# Patient Record
Sex: FEMALE | Race: WHITE | HISPANIC OR LATINO | URBAN - METROPOLITAN AREA
[De-identification: names, ages, dates, MRNs, and addresses within clinical notes are randomized per-mention and may not be internally consistent; named-entity substitution may affect disease eponyms.]

---

## 2017-07-02 ENCOUNTER — APPOINTMENT (OUTPATIENT)
Dept: RADIOLOGY | Facility: MEDICAL CENTER | Age: 47
DRG: 863 | End: 2017-07-02
Attending: EMERGENCY MEDICINE
Payer: COMMERCIAL

## 2017-07-02 ENCOUNTER — APPOINTMENT (OUTPATIENT)
Dept: RADIOLOGY | Facility: MEDICAL CENTER | Age: 47
DRG: 863 | End: 2017-07-02
Payer: COMMERCIAL

## 2017-07-02 ENCOUNTER — HOSPITAL ENCOUNTER (INPATIENT)
Facility: MEDICAL CENTER | Age: 47
LOS: 3 days | DRG: 863 | End: 2017-07-05
Attending: EMERGENCY MEDICINE | Admitting: HOSPITALIST
Payer: COMMERCIAL

## 2017-07-02 ENCOUNTER — RESOLUTE PROFESSIONAL BILLING HOSPITAL PROF FEE (OUTPATIENT)
Dept: HOSPITALIST | Facility: MEDICAL CENTER | Age: 47
End: 2017-07-02
Payer: COMMERCIAL

## 2017-07-02 DIAGNOSIS — L03.313 CELLULITIS OF CHEST WALL: ICD-10-CM

## 2017-07-02 PROBLEM — N61.1 BREAST ABSCESS: Status: ACTIVE | Noted: 2017-07-02

## 2017-07-02 PROBLEM — L03.90 CELLULITIS: Status: ACTIVE | Noted: 2017-07-02

## 2017-07-02 LAB
ALBUMIN SERPL BCP-MCNC: 3.9 G/DL (ref 3.2–4.9)
ALBUMIN/GLOB SERPL: 1.1 G/DL
ALP SERPL-CCNC: 60 U/L (ref 30–99)
ALT SERPL-CCNC: 20 U/L (ref 2–50)
ANION GAP SERPL CALC-SCNC: 8 MMOL/L (ref 0–11.9)
APPEARANCE UR: CLEAR
APTT PPP: 31.9 SEC (ref 24.7–36)
AST SERPL-CCNC: 16 U/L (ref 12–45)
BASOPHILS # BLD AUTO: 0.5 % (ref 0–1.8)
BASOPHILS # BLD: 0.06 K/UL (ref 0–0.12)
BILIRUB SERPL-MCNC: 0.4 MG/DL (ref 0.1–1.5)
BILIRUB UR QL STRIP.AUTO: NEGATIVE
BUN SERPL-MCNC: 10 MG/DL (ref 8–22)
CALCIUM SERPL-MCNC: 9.6 MG/DL (ref 8.5–10.5)
CHLORIDE SERPL-SCNC: 102 MMOL/L (ref 96–112)
CO2 SERPL-SCNC: 24 MMOL/L (ref 20–33)
COLOR UR: YELLOW
CREAT SERPL-MCNC: 0.7 MG/DL (ref 0.5–1.4)
CRP SERPL HS-MCNC: 157.6 MG/L (ref 0–7.5)
EOSINOPHIL # BLD AUTO: 0.29 K/UL (ref 0–0.51)
EOSINOPHIL NFR BLD: 2.6 % (ref 0–6.9)
ERYTHROCYTE [DISTWIDTH] IN BLOOD BY AUTOMATED COUNT: 42.6 FL (ref 35.9–50)
ERYTHROCYTE [SEDIMENTATION RATE] IN BLOOD BY WESTERGREN METHOD: 64 MM/HOUR (ref 0–20)
GFR SERPL CREATININE-BSD FRML MDRD: >60 ML/MIN/1.73 M 2
GLOBULIN SER CALC-MCNC: 3.4 G/DL (ref 1.9–3.5)
GLUCOSE SERPL-MCNC: 100 MG/DL (ref 65–99)
GLUCOSE UR STRIP.AUTO-MCNC: NEGATIVE MG/DL
HCT VFR BLD AUTO: 37.9 % (ref 37–47)
HGB BLD-MCNC: 12.8 G/DL (ref 12–16)
IMM GRANULOCYTES # BLD AUTO: 0.04 K/UL (ref 0–0.11)
IMM GRANULOCYTES NFR BLD AUTO: 0.4 % (ref 0–0.9)
INR PPP: 1 (ref 0.87–1.13)
KETONES UR STRIP.AUTO-MCNC: NEGATIVE MG/DL
LACTATE BLD-SCNC: 1.2 MMOL/L (ref 0.5–2)
LACTATE BLD-SCNC: 1.4 MMOL/L (ref 0.5–2)
LEUKOCYTE ESTERASE UR QL STRIP.AUTO: NEGATIVE
LYMPHOCYTES # BLD AUTO: 2.41 K/UL (ref 1–4.8)
LYMPHOCYTES NFR BLD: 21.9 % (ref 22–41)
MCH RBC QN AUTO: 29.9 PG (ref 27–33)
MCHC RBC AUTO-ENTMCNC: 33.8 G/DL (ref 33.6–35)
MCV RBC AUTO: 88.6 FL (ref 81.4–97.8)
MICRO URNS: NORMAL
MONOCYTES # BLD AUTO: 0.71 K/UL (ref 0–0.85)
MONOCYTES NFR BLD AUTO: 6.4 % (ref 0–13.4)
NEUTROPHILS # BLD AUTO: 7.5 K/UL (ref 2–7.15)
NEUTROPHILS NFR BLD: 68.2 % (ref 44–72)
NITRITE UR QL STRIP.AUTO: NEGATIVE
NRBC # BLD AUTO: 0 K/UL
NRBC BLD AUTO-RTO: 0 /100 WBC
PH UR STRIP.AUTO: 6 [PH]
PLATELET # BLD AUTO: 347 K/UL (ref 164–446)
PMV BLD AUTO: 8.6 FL (ref 9–12.9)
POTASSIUM SERPL-SCNC: 3.9 MMOL/L (ref 3.6–5.5)
PROT SERPL-MCNC: 7.3 G/DL (ref 6–8.2)
PROT UR QL STRIP: NEGATIVE MG/DL
PROTHROMBIN TIME: 13.5 SEC (ref 12–14.6)
RBC # BLD AUTO: 4.28 M/UL (ref 4.2–5.4)
RBC UR QL AUTO: NEGATIVE
SODIUM SERPL-SCNC: 134 MMOL/L (ref 135–145)
SP GR UR STRIP.AUTO: 1.01
WBC # BLD AUTO: 11 K/UL (ref 4.8–10.8)

## 2017-07-02 PROCEDURE — 76604 US EXAM CHEST: CPT

## 2017-07-02 PROCEDURE — 85730 THROMBOPLASTIN TIME PARTIAL: CPT

## 2017-07-02 PROCEDURE — 700111 HCHG RX REV CODE 636 W/ 250 OVERRIDE (IP): Performed by: HOSPITALIST

## 2017-07-02 PROCEDURE — A9270 NON-COVERED ITEM OR SERVICE: HCPCS | Performed by: HOSPITALIST

## 2017-07-02 PROCEDURE — 87040 BLOOD CULTURE FOR BACTERIA: CPT

## 2017-07-02 PROCEDURE — 86141 C-REACTIVE PROTEIN HS: CPT

## 2017-07-02 PROCEDURE — 87086 URINE CULTURE/COLONY COUNT: CPT

## 2017-07-02 PROCEDURE — 85610 PROTHROMBIN TIME: CPT

## 2017-07-02 PROCEDURE — 770001 HCHG ROOM/CARE - MED/SURG/GYN PRIV*

## 2017-07-02 PROCEDURE — 700105 HCHG RX REV CODE 258: Performed by: EMERGENCY MEDICINE

## 2017-07-02 PROCEDURE — 96365 THER/PROPH/DIAG IV INF INIT: CPT

## 2017-07-02 PROCEDURE — 87205 SMEAR GRAM STAIN: CPT

## 2017-07-02 PROCEDURE — 96368 THER/DIAG CONCURRENT INF: CPT

## 2017-07-02 PROCEDURE — 700102 HCHG RX REV CODE 250 W/ 637 OVERRIDE(OP): Performed by: HOSPITALIST

## 2017-07-02 PROCEDURE — 71010 DX-CHEST-PORTABLE (1 VIEW): CPT

## 2017-07-02 PROCEDURE — 700111 HCHG RX REV CODE 636 W/ 250 OVERRIDE (IP): Performed by: EMERGENCY MEDICINE

## 2017-07-02 PROCEDURE — 85025 COMPLETE CBC W/AUTO DIFF WBC: CPT

## 2017-07-02 PROCEDURE — 700101 HCHG RX REV CODE 250: Performed by: SURGERY

## 2017-07-02 PROCEDURE — 81003 URINALYSIS AUTO W/O SCOPE: CPT

## 2017-07-02 PROCEDURE — 87070 CULTURE OTHR SPECIMN AEROBIC: CPT

## 2017-07-02 PROCEDURE — 99223 1ST HOSP IP/OBS HIGH 75: CPT | Performed by: HOSPITALIST

## 2017-07-02 PROCEDURE — 96366 THER/PROPH/DIAG IV INF ADDON: CPT

## 2017-07-02 PROCEDURE — 99285 EMERGENCY DEPT VISIT HI MDM: CPT

## 2017-07-02 PROCEDURE — 700105 HCHG RX REV CODE 258: Performed by: HOSPITALIST

## 2017-07-02 PROCEDURE — 83605 ASSAY OF LACTIC ACID: CPT

## 2017-07-02 PROCEDURE — 85652 RBC SED RATE AUTOMATED: CPT

## 2017-07-02 PROCEDURE — 303977 HCHG I & D

## 2017-07-02 PROCEDURE — 80053 COMPREHEN METABOLIC PANEL: CPT

## 2017-07-02 PROCEDURE — 87075 CULTR BACTERIA EXCEPT BLOOD: CPT

## 2017-07-02 RX ORDER — BISACODYL 10 MG
10 SUPPOSITORY, RECTAL RECTAL
Status: DISCONTINUED | OUTPATIENT
Start: 2017-07-02 | End: 2017-07-05 | Stop reason: HOSPADM

## 2017-07-02 RX ORDER — CEPHALEXIN 500 MG/1
500 CAPSULE ORAL 3 TIMES DAILY
Status: ON HOLD | COMMUNITY
Start: 2017-06-16 | End: 2017-07-05

## 2017-07-02 RX ORDER — OXYCODONE HYDROCHLORIDE AND ACETAMINOPHEN 5; 325 MG/1; MG/1
1-2 TABLET ORAL EVERY 6 HOURS PRN
Status: DISCONTINUED | OUTPATIENT
Start: 2017-07-02 | End: 2017-07-05 | Stop reason: HOSPADM

## 2017-07-02 RX ORDER — ONDANSETRON 2 MG/ML
4 INJECTION INTRAMUSCULAR; INTRAVENOUS EVERY 4 HOURS PRN
Status: DISCONTINUED | OUTPATIENT
Start: 2017-07-02 | End: 2017-07-05 | Stop reason: HOSPADM

## 2017-07-02 RX ORDER — ONDANSETRON 4 MG/1
4 TABLET, ORALLY DISINTEGRATING ORAL EVERY 4 HOURS PRN
Status: DISCONTINUED | OUTPATIENT
Start: 2017-07-02 | End: 2017-07-05 | Stop reason: HOSPADM

## 2017-07-02 RX ORDER — PROMETHAZINE HYDROCHLORIDE 25 MG/1
12.5-25 SUPPOSITORY RECTAL EVERY 4 HOURS PRN
Status: DISCONTINUED | OUTPATIENT
Start: 2017-07-02 | End: 2017-07-05 | Stop reason: HOSPADM

## 2017-07-02 RX ORDER — DOCUSATE SODIUM 100 MG/1
200 CAPSULE, LIQUID FILLED ORAL DAILY
Status: DISCONTINUED | OUTPATIENT
Start: 2017-07-02 | End: 2017-07-02

## 2017-07-02 RX ORDER — DOCUSATE SODIUM 100 MG/1
200 CAPSULE, LIQUID FILLED ORAL DAILY
COMMUNITY

## 2017-07-02 RX ORDER — SODIUM CHLORIDE 9 MG/ML
INJECTION, SOLUTION INTRAVENOUS CONTINUOUS
Status: DISCONTINUED | OUTPATIENT
Start: 2017-07-02 | End: 2017-07-03

## 2017-07-02 RX ORDER — LIDOCAINE HYDROCHLORIDE AND EPINEPHRINE BITARTRATE 20; .01 MG/ML; MG/ML
10 INJECTION, SOLUTION SUBCUTANEOUS ONCE
Status: COMPLETED | OUTPATIENT
Start: 2017-07-02 | End: 2017-07-02

## 2017-07-02 RX ORDER — ALPRAZOLAM 0.25 MG/1
0.25 TABLET ORAL 4 TIMES DAILY PRN
Status: DISCONTINUED | OUTPATIENT
Start: 2017-07-02 | End: 2017-07-05 | Stop reason: HOSPADM

## 2017-07-02 RX ORDER — PROMETHAZINE HYDROCHLORIDE 25 MG/1
12.5-25 TABLET ORAL EVERY 4 HOURS PRN
Status: DISCONTINUED | OUTPATIENT
Start: 2017-07-02 | End: 2017-07-05 | Stop reason: HOSPADM

## 2017-07-02 RX ORDER — ASPIRIN 325 MG
2 TABLET ORAL DAILY
COMMUNITY

## 2017-07-02 RX ORDER — POLYETHYLENE GLYCOL 3350 17 G/17G
1 POWDER, FOR SOLUTION ORAL
Status: DISCONTINUED | OUTPATIENT
Start: 2017-07-02 | End: 2017-07-05 | Stop reason: HOSPADM

## 2017-07-02 RX ORDER — LIDOCAINE HYDROCHLORIDE AND EPINEPHRINE 10; 10 MG/ML; UG/ML
20 INJECTION, SOLUTION INFILTRATION; PERINEURAL ONCE
Status: DISCONTINUED | OUTPATIENT
Start: 2017-07-02 | End: 2017-07-02

## 2017-07-02 RX ORDER — DIPHENHYDRAMINE HYDROCHLORIDE 50 MG/ML
25 INJECTION INTRAMUSCULAR; INTRAVENOUS EVERY 8 HOURS
Status: DISCONTINUED | OUTPATIENT
Start: 2017-07-03 | End: 2017-07-04

## 2017-07-02 RX ORDER — AMOXICILLIN 250 MG
2 CAPSULE ORAL 2 TIMES DAILY
Status: DISCONTINUED | OUTPATIENT
Start: 2017-07-03 | End: 2017-07-05 | Stop reason: HOSPADM

## 2017-07-02 RX ORDER — OXYCODONE HYDROCHLORIDE AND ACETAMINOPHEN 5; 325 MG/1; MG/1
1-2 TABLET ORAL EVERY 6 HOURS PRN
COMMUNITY

## 2017-07-02 RX ORDER — AMPICILLIN AND SULBACTAM 2; 1 G/1; G/1
3 INJECTION, POWDER, FOR SOLUTION INTRAMUSCULAR; INTRAVENOUS ONCE
Status: COMPLETED | OUTPATIENT
Start: 2017-07-02 | End: 2017-07-02

## 2017-07-02 RX ADMIN — ALPRAZOLAM 0.25 MG: 0.25 TABLET ORAL at 22:24

## 2017-07-02 RX ADMIN — LIDOCAINE HYDROCHLORIDE AND EPINEPHRINE 10 ML: 20; 10 INJECTION, SOLUTION INFILTRATION; PERINEURAL at 20:15

## 2017-07-02 RX ADMIN — AMPICILLIN SODIUM AND SULBACTAM SODIUM 3 G: 2; 1 INJECTION, POWDER, FOR SOLUTION INTRAMUSCULAR; INTRAVENOUS at 22:23

## 2017-07-02 RX ADMIN — SODIUM CHLORIDE: 9 INJECTION, SOLUTION INTRAVENOUS at 22:23

## 2017-07-02 RX ADMIN — AMPICILLIN SODIUM AND SULBACTAM SODIUM 3 G: 2; 1 INJECTION, POWDER, FOR SOLUTION INTRAMUSCULAR; INTRAVENOUS at 17:12

## 2017-07-02 RX ADMIN — VANCOMYCIN HYDROCHLORIDE 1900 MG: 100 INJECTION, POWDER, LYOPHILIZED, FOR SOLUTION INTRAVENOUS at 17:29

## 2017-07-02 ASSESSMENT — PAIN SCALES - GENERAL
PAINLEVEL_OUTOF10: 0
PAINLEVEL_OUTOF10: 0

## 2017-07-02 ASSESSMENT — LIFESTYLE VARIABLES
EVER_SMOKED: NEVER
DO YOU DRINK ALCOHOL: NO

## 2017-07-02 NOTE — ED PROVIDER NOTES
ED Provider Note    CHIEF COMPLAINT  Chief Complaint   Patient presents with   • Sent by MD     Sent by Dr. Birmingham, double mastectomy on June 14, drains pulled on Thursday, breast red, swollen, hot to touch, patient febrile       HPI  Mine Yap is a 46 y.o. female who presents with erythematous change to the right chest wall.  Patient had placement of expanders following mastectomy.  Drains had been pulled out last Thursday by Dr. Birmingham who called from her office stating the patient appeared to have infection of the right chest wall/breast.  She requested the patient be admitted for IV antibiotics and that ultrasound be obtained to evaluate for possible abscess.  Patient states over the past 2-3 days redness swelling and warmth to the skin as well as chills at home have developed.  She arrives tachycardic, septic protocol initiated.  Pain is mild-to-moderate, patient states she took her own Percocet this morning for pain, currently refusing any pain medication.  She denies acute numbness or weakness to the extremities.  No cough or shortness of breath.  No sore throat.  No vomiting or diarrhea    REVIEW OF SYSTEMS    Constitutional: Chills  Respiratory: No shortness of breath or cough  Cardiac: No exertional chest pain or syncope  Gastrointestinal: No abdominal pain  Musculoskeletal: Right chest wall pain  Neurologic: No headache  Skin: Redness swelling and induration without drainage to the right surgical site on breast       All other systems are negative.       PAST MEDICAL HISTORY  Past Medical History   Diagnosis Date   • Cancer (CMS-[facility])      bilateral breast       FAMILY HISTORY  History reviewed. No pertinent family history.    SOCIAL HISTORY  Social History     Social History   • Marital Status:      Spouse Name: N/A   • Number of Children: N/A   • Years of Education: N/A     Social History Main Topics   • Smoking status: Never Smoker    • Smokeless tobacco: None   • Alcohol Use: No   •  Drug Use: No   • Sexual Activity: Not Asked     Other Topics Concern   • None     Social History Narrative   • None       SURGICAL HISTORY  Past Surgical History   Procedure Laterality Date   • Other       bilateral mastectomy       CURRENT MEDICATIONS  Home Medications     Reviewed by Laure Ashford (Pharmacy Tech) on 07/02/17 at 1635  Med List Status: Complete    Medication Last Dose Status    cephALEXin (KEFLEX) 500 MG Cap 6/29/2017 Active    docusate sodium (COLACE) 100 MG Cap 7/2/2017 Active    Multiple Vitamins-Minerals (MULTIVITAMIN GUMMIES ADULT) Chew Tab 7/1/2017 Active    oxycodone-acetaminophen (PERCOCET) 5-325 MG Tab 7/2/2017 Active                ALLERGIES  No Known Allergies    PHYSICAL EXAM  VITAL SIGNS: /70 mmHg  Pulse 85  Temp(Src) 37.3 °C (99.1 °F) (Temporal)  Resp 18  Ht 1.524 m (5')  Wt 75.9 kg (167 lb 5.3 oz)  BMI 32.68 kg/m2  SpO2 96%  LMP 06/06/2017  Constitutional:  Non-toxic appearance.   HENT: No facial swelling  Eyes: Anicteric, no conjunctivitis.     Cardiovascular: Tachycardic heart rate, Normal rhythm  Pulmonary:  No wheezing, No rales.   Gastrointestinal: Soft, No tenderness, No masses  Skin: Warm, Dry, No cyanosis.  Erythematous change with induration and warmth large area right breast, no fluctuance  Neurologic: Speech is clear, follows commands, facial expression is symmetrical.  Psychiatric: Affect normal,  Mood normal.  Patient is calm and cooperative  Musculoskeletal: Right anterior chest wall tenderness corresponding to the area of cellulitis    EKG/Labs  Results for orders placed or performed during the hospital encounter of 07/02/17   Lactic acid (lactate)   Result Value Ref Range    Lactic Acid 1.4 0.5 - 2.0 mmol/L   Lactic acid (lactate)   Result Value Ref Range    Lactic Acid 1.2 0.5 - 2.0 mmol/L   CBC WITH DIFFERENTIAL   Result Value Ref Range    WBC 11.0 (H) 4.8 - 10.8 K/uL    RBC 4.28 4.20 - 5.40 M/uL    Hemoglobin 12.8 12.0 - 16.0 g/dL     Hematocrit 37.9 37.0 - 47.0 %    MCV 88.6 81.4 - 97.8 fL    MCH 29.9 27.0 - 33.0 pg    MCHC 33.8 33.6 - 35.0 g/dL    RDW 42.6 35.9 - 50.0 fL    Platelet Count 347 164 - 446 K/uL    MPV 8.6 (L) 9.0 - 12.9 fL    Neutrophils-Polys 68.20 44.00 - 72.00 %    Lymphocytes 21.90 (L) 22.00 - 41.00 %    Monocytes 6.40 0.00 - 13.40 %    Eosinophils 2.60 0.00 - 6.90 %    Basophils 0.50 0.00 - 1.80 %    Immature Granulocytes 0.40 0.00 - 0.90 %    Nucleated RBC 0.00 /100 WBC    Neutrophils (Absolute) 7.50 (H) 2.00 - 7.15 K/uL    Lymphs (Absolute) 2.41 1.00 - 4.80 K/uL    Monos (Absolute) 0.71 0.00 - 0.85 K/uL    Eos (Absolute) 0.29 0.00 - 0.51 K/uL    Baso (Absolute) 0.06 0.00 - 0.12 K/uL    Immature Granulocytes (abs) 0.04 0.00 - 0.11 K/uL    NRBC (Absolute) 0.00 K/uL   COMP METABOLIC PANEL   Result Value Ref Range    Sodium 134 (L) 135 - 145 mmol/L    Potassium 3.9 3.6 - 5.5 mmol/L    Chloride 102 96 - 112 mmol/L    Co2 24 20 - 33 mmol/L    Anion Gap 8.0 0.0 - 11.9    Glucose 100 (H) 65 - 99 mg/dL    Bun 10 8 - 22 mg/dL    Creatinine 0.70 0.50 - 1.40 mg/dL    Calcium 9.6 8.5 - 10.5 mg/dL    AST(SGOT) 16 12 - 45 U/L    ALT(SGPT) 20 2 - 50 U/L    Alkaline Phosphatase 60 30 - 99 U/L    Total Bilirubin 0.4 0.1 - 1.5 mg/dL    Albumin 3.9 3.2 - 4.9 g/dL    Total Protein 7.3 6.0 - 8.2 g/dL    Globulin 3.4 1.9 - 3.5 g/dL    A-G Ratio 1.1 g/dL   URINALYSIS   Result Value Ref Range    Color Yellow     Character Clear     Specific Gravity 1.015 <1.035    Ph 6.0 5.0-8.0    Glucose Negative Negative mg/dL    Ketones Negative Negative mg/dL    Protein Negative Negative mg/dL    Bilirubin Negative Negative    Nitrite Negative Negative    Leukocyte Esterase Negative Negative    Occult Blood Negative Negative    Micro Urine Req see below    PROTHROMBIN TIME   Result Value Ref Range    PT 13.5 12.0 - 14.6 sec    INR 1.00 0.87 - 1.13   APTT   Result Value Ref Range    APTT 31.9 24.7 - 36.0 sec   ESTIMATED GFR   Result Value Ref Range    GFR If  African American >60 >60 mL/min/1.73 m 2    GFR If Non African American >60 >60 mL/min/1.73 m 2   WESTERGREN SED RATE   Result Value Ref Range    Sed Rate Westergren 64 (H) 0 - 20 mm/hour   CRP HIGH SENSITIVE (CARDIAC)   Result Value Ref Range    C Reactive Protein High Sensitive 157.6 (H) 0.0 - 7.5 mg/L         RADIOLOGY/PROCEDURES  DX-CHEST-PORTABLE (1 VIEW)   Final Result      No evidence of acute cardiopulmonary process.      US-CHEST   Final Result      Tiny amount of fluid seen within the subcutaneous fat of the right chest wall. There is no evidence of a large organized fluid collection to suggest presence of abscess.            COURSE & MEDICAL DECISION MAKING  Pertinent Labs & Imaging studies reviewed. (See chart for details)  Small collection of fluid was noted, Dr. Nugent stated she would tap this fluid.  Patient was started on IV vancomycin and Unasyn for coverage of skin infection in setting of possible sepsis.  Initial lactic acid came back negative, patient does not appear to be septic at this time.  She is admitted for ongoing workup and medical stabilization.    FINAL IMPRESSION     1. Cellulitis of chest wall                    Electronically signed by: Ray Khan, 7/2/2017 6:48 PM

## 2017-07-02 NOTE — ED NOTES
.  Chief Complaint   Patient presents with   • Sent by MD     Sent by Dr. Birmingham, double mastectomy on June 14, drains pulled on Thursday, breast red, swollen, hot to touch, patient febrile     ./70 mmHg  Pulse 107  Temp(Src) 37.3 °C (99.1 °F) (Temporal)  Resp 18  Ht 1.524 m (5')  Wt 75.9 kg (167 lb 5.3 oz)  BMI 32.68 kg/m2  SpO2 96%    Ambulatory to triage with above complaints, sepsis score = 3

## 2017-07-02 NOTE — IP AVS SNAPSHOT
[employer] Access Code: [email or web address]  Expires: 8/4/2017  1:02 PM    Your email address is not on file at [facility].  Email Addresses are required for you to sign up for [employer], please contact [phone] to verify your personal information and to provide your email address prior to attempting to register for [employer].    Mine Yap  [facility] [place] [address]    [employer]  A secure, online tool to manage your health information     [facility]’s [employer]® is a secure, online tool that connects you to your personalized health information from the privacy of your home -- day or night - making it very easy for you to manage your healthcare. Once the activation process is completed, you can even access your medical information using the [employer] odalys, which is available for free in the Apple Odalys store or Google Play store.     To learn more about [employer] Systems, visit [email or web address]    There are two levels of access available (as shown below):   My Chart Features  [facility] Primary Care Doctor [facility]  Specialists [facility]  Urgent  Care Non-[facility] Primary Care Doctor   Email your healthcare team securely and privately 24/7 X X X    Manage appointments: schedule your next appointment; view details of past/upcoming appointments X      Request prescription refills. X      View recent personal medical records, including lab and immunizations X X X X   View health record, including health history, allergies, medications X X X X   Read reports about your outpatient visits, procedures, consult and ER notes X X X X   See your discharge summary, which is a recap of your hospital and/or ER visit that includes your diagnosis, lab results, and care plan X X  X     How to register for [employer]:  Once your e-mail address has been verified, follow the following steps to sign up for [employer].     1. Go to  [email or web address]  2. Click on the Sign Up Now box, which takes you to the New Member Sign Up page.  You will need to provide the following information:  a. Enter your [employer] Access Code exactly as it appears at the top of this page. (You will not need to use this code after you’ve completed the sign-up process. If you do not sign up before the expiration date, you must request a new code.)   b. Enter your date of birth.   c. Enter your home email address.   d. Click Submit, and follow the next screen’s instructions.  3. Create a [employer] ID. This will be your [employer] login ID and cannot be changed, so think of one that is secure and easy to remember.  4. Create a [employer] password. You can change your password at any time.  5. Enter your Password Reset Question and Answer. This can be used at a later time if you forget your password.   6. Enter your e-mail address. This allows you to receive e-mail notifications when new information is available in [employer].  7. Click Sign Up. You can now view your health information.    For assistance activating your [employer] account, call [phone]

## 2017-07-02 NOTE — IP AVS SNAPSHOT
Home Care Instructions                                                                                                                  Name:Mine Yap  Medical Record Number:[id number]  CSN: [id number]    YOB: 1970   Age: 46 y.o.  Sex: female  HT:1.524 m (5') WT: 75.9 kg (167 lb 5.3 oz)          Admit Date: 7/2/2017     Discharge Date:   Today's Date: 7/5/2017  Attending Doctor:  Jairo Mason M.D.                  Allergies:  Review of patient's allergies indicates no known allergies.            Discharge Instructions       Discharge Instructions    Discharged to home by car with relative. Discharged via walking, hospital escort: Refused.  Special equipment needed: Not Applicable    Be sure to schedule a follow-up appointment with your primary care doctor or any specialists as instructed.     Discharge Plan:   Diet Plan: Discussed  Activity Level: Discussed  Confirmed Follow up Appointment: Patient to Call and Schedule Appointment  Confirmed Symptoms Management: Discussed  Medication Reconciliation Updated: Yes  Influenza Vaccine Indication: Indicated: Not available from distributor/    I understand that a diet low in cholesterol, fat, and sodium is recommended for good health. Unless I have been given specific instructions below for another diet, I accept this instruction as my diet prescription.   Other diet: Regular as tolerated    Special Instructions: None    · Is patient discharged on Warfarin / Coumadin?   No     · Is patient Post Blood Transfusion?  No    Depression / Suicide Risk    As you are discharged from this [facility] facility, it is important to learn how to keep safe from harming yourself.    Recognize the warning signs:  · Abrupt changes in personality, positive or negative- including increase in energy   · Giving away possessions  · Change in eating patterns- significant weight changes-  positive or negative  · Change in sleeping patterns- unable to  sleep or sleeping all the time   · Unwillingness or inability to communicate  · Depression  · Unusual sadness, discouragement and loneliness  · Talk of wanting to die  · Neglect of personal appearance   · Rebelliousness- reckless behavior  · Withdrawal from people/activities they love  · Confusion- inability to concentrate     If you or a loved one observes any of these behaviors or has concerns about self-harm, here's what you can do:  · Talk about it- your feelings and reasons for harming yourself  · Remove any means that you might use to hurt yourself (examples: pills, rope, extension cords, firearm)  · Get professional help from the community (Mental Health, Substance Abuse, psychological counseling)  · Do not be alone:Call your Safe Contact- someone whom you trust who will be there for you.  · Call your local CRISIS HOTLINE 183-9396 or 090-661-2586  · Call your local Children's Mobile Crisis Response Team [place] (756) 317-3585 or www.ZIRX  · Call the toll free National Suicide Prevention Hotlines   · National Suicide Prevention Lifeline 165-558-XMEQ (4012)  · National Hope Line Network 800-SUICIDE (422-2211)        Follow-up Information     1. Follow up with Holly Nugent M.D. In 1 week.    Specialty:  Plastic Surgery    Contact information    [address]  [facility] [address] [phone]           Discharge Medication Instructions:    Below are the medications your physician expects you to take upon discharge:    Review all your home medications and newly ordered medications with your doctor and/or pharmacist. Follow medication instructions as directed by your doctor and/or pharmacist.    Please keep your medication list with you and share with your physician.               Medication List      START taking these medications        Instructions    Morning Afternoon Evening Bedtime    amoxicillin-clavulanate 875-125 MG Tabs   Commonly known as:  AUGMENTIN        Take 1 Tab by mouth 2  times a day.   Dose:  1 Tab                        doxycycline 100 MG Tabs   Commonly known as:  VIBRAMYCIN        Take 1 Tab by mouth 2 times a day.   Dose:  100 mg                          CONTINUE taking these medications        Instructions    Morning Afternoon Evening Bedtime    docusate sodium 100 MG Caps   Commonly known as:  COLACE        Take 200 mg by mouth every day.   Dose:  200 mg                        MULTIVITAMIN GUMMIES ADULT Chew        Take 2 Tabs by mouth every day.   Dose:  2 Tab                        oxycodone-acetaminophen 5-325 MG Tabs   Last time this was given:  1 Tab on 7/4/2017  4:50 PM   Commonly known as:  PERCOCET        Take 1-2 Tabs by mouth every 6 hours as needed for Severe Pain.   Dose:  1-2 Tab                          STOP taking these medications     KEFLEX 500 MG Caps   Generic drug:  cephALEXin                    Where to Get Your Medications      These medications were sent to [id number] - [place], [place] - [address]  [address] [place] [address]     Phone:  [phone]    - amoxicillin-clavulanate 875-125 MG Tabs  - doxycycline 100 MG Tabs            Instructions           Diet / Nutrition:    Follow any diet instructions given to you by your doctor or the dietician, including how much salt (sodium) you are allowed each day.    If you are overweight, talk to your doctor about a weight reduction plan.    Activity:    Remain physically active following your doctor's instructions about exercise and activity.    Rest often.     Any time you become even a little tired or short of breath, SIT DOWN and rest.    Worsening Symptoms:    Report any of the following signs and symptoms to the doctor's office immediately:    *Pain of jaw, arm, or neck  *Chest pain not relieved by medication                               *Dizziness or loss of consciousness  *Difficulty breathing even when at rest   *More tired than usual                                       *Bleeding  drainage or swelling of surgical site  *Swelling of feet, ankles, legs or stomach                 *Fever (>100ºF)  *Pink or blood tinged sputum  *Weight gain (3lbs/day or 5lbs /week)           *Shock from internal defibrillator (if applicable)  *Palpitations or irregular heartbeats                *Cool and/or numb extremities    Stroke Awareness    Common Risk Factors for Stroke include:    Age  Atrial Fibrillation  Carotid Artery Stenosis  Diabetes Mellitus  Excessive alcohol consumption  High blood pressure  Overweight   Physical inactivity  Smoking    Warning signs and symptoms of a stroke include:    *Sudden numbness or weakness of the face, arm or leg (especially on one side of the body).  *Sudden confusion, trouble speaking or understanding.  *Sudden trouble seeing in one or both eyes.  *Sudden trouble walking, dizziness, loss of balance or coordination.Sudden severe headache with no known cause.    It is very important to get treatment quickly when a stroke occurs. If you experience any of the above warning signs, call 911 immediately.                   Disclaimer         Quit Smoking / Tobacco Use:    I understand the use of any tobacco products increases my chance of suffering from future heart disease or stroke and could cause other illnesses which may shorten my life. Quitting the use of tobacco products is the single most important thing I can do to improve my health. For further information on smoking / tobacco cessation call a Toll Free Quit Line at 1-714.632.8106 (*[employer] [place]) or 1-898.469.2215 (American Lung Association) or you can access the web based program at www.lungusa.org.    [place] Tobacco Users Help Line:  (346) 719-9551       Toll Free: 1-465.845.8775  Quit Tobacco Program [facility] (714)531-6361    Crisis Hotline:    [place] Crisis Hotline:  6-816-OEZJHXQ or 1-359.853.8540    [place] Crisis Hotline:    1-705.750.5264 or 625-409-3846    Discharge  Survey:   Thank you for choosing [facility]. We hope we did everything we could to make your hospital stay a pleasant one. You may be receiving a phone survey and we would appreciate your time and participation in answering the questions. Your input is very valuable to us in our efforts to improve our service to our patients and their families.        My signature on this form indicates that:    1. I have reviewed and understand the above information.  2. My questions regarding this information have been answered to my satisfaction.  3. I have formulated a plan with my discharge nurse to obtain my prescribed medications for home.                  Disclaimer         __________________________________                     __________       ________                       Patient Signature                                                 Date                    Time

## 2017-07-02 NOTE — IP AVS SNAPSHOT
7/5/2017    Mine Yap  [address]  [place] [address]    Dear Mine:    [facility] wants to ensure your discharge home is safe and you or your loved ones have had all of your questions answered regarding your care after you leave the hospital.    Below is a list of resources and contact information should you have any questions regarding your hospital stay, follow-up instructions, or active medical symptoms.    Questions or Concerns Regarding… Contact   Medical Questions Related to Your Discharge  (7 days a week, 8am-5pm) Contact a Nurse Care Coordinator   [phone]   Medical Questions Not Related to Your Discharge  (24 hours a day / 7 days a week)  Contact the Nurse Health Line   [phone]    Medications or Discharge Instructions Refer to your discharge packet   or contact your [facility] Primary Care Provider   [phone]   Follow-up Appointment(s) Schedule your appointment via [employer]   or contact Scheduling [phone]   Billing Review your statement via Forgame  or contact Billing [phone]   Medical Records Review your records via Forgame   or contact Medical Records [phone]     You may receive a telephone call within two days of discharge. This call is to make certain you understand your discharge instructions and have the opportunity to have any questions answered. You can also easily access your medical information, test results and upcoming appointments via the Forgame free online health management tool. You can learn more and sign up at [employer]/Forgame. For assistance setting up your Forgame account, please call [phone].    Once again, we want to ensure your discharge home is safe and that you have a clear understanding of any next steps in your care. If you have any questions or concerns, please do not hesitate to contact us, we are here for you. Thank you for choosing [facility] for your healthcare needs.    Sincerely,    Your [facility] Healthcare Team

## 2017-07-03 PROBLEM — M79.601 PAIN OF RIGHT UPPER EXTREMITY: Status: ACTIVE | Noted: 2017-07-03

## 2017-07-03 LAB
GRAM STN SPEC: NORMAL
SIGNIFICANT IND 70042: NORMAL
SITE SITE: NORMAL
SOURCE SOURCE: NORMAL
VANCOMYCIN SERPL-MCNC: 20.3 UG/ML
VANCOMYCIN TROUGH SERPL-MCNC: 32 UG/ML (ref 10–20)

## 2017-07-03 PROCEDURE — 700102 HCHG RX REV CODE 250 W/ 637 OVERRIDE(OP): Performed by: HOSPITALIST

## 2017-07-03 PROCEDURE — 99233 SBSQ HOSP IP/OBS HIGH 50: CPT | Performed by: HOSPITALIST

## 2017-07-03 PROCEDURE — 80202 ASSAY OF VANCOMYCIN: CPT | Mod: 91

## 2017-07-03 PROCEDURE — 36415 COLL VENOUS BLD VENIPUNCTURE: CPT

## 2017-07-03 PROCEDURE — A9270 NON-COVERED ITEM OR SERVICE: HCPCS | Performed by: HOSPITALIST

## 2017-07-03 PROCEDURE — 700111 HCHG RX REV CODE 636 W/ 250 OVERRIDE (IP): Performed by: HOSPITALIST

## 2017-07-03 PROCEDURE — 700105 HCHG RX REV CODE 258: Performed by: HOSPITALIST

## 2017-07-03 PROCEDURE — 700111 HCHG RX REV CODE 636 W/ 250 OVERRIDE (IP)

## 2017-07-03 PROCEDURE — 700105 HCHG RX REV CODE 258

## 2017-07-03 PROCEDURE — 93971 EXTREMITY STUDY: CPT

## 2017-07-03 PROCEDURE — 770001 HCHG ROOM/CARE - MED/SURG/GYN PRIV*

## 2017-07-03 RX ORDER — HEPARIN SODIUM 5000 [USP'U]/ML
5000 INJECTION, SOLUTION INTRAVENOUS; SUBCUTANEOUS EVERY 8 HOURS
Status: DISCONTINUED | OUTPATIENT
Start: 2017-07-03 | End: 2017-07-05 | Stop reason: HOSPADM

## 2017-07-03 RX ADMIN — HEPARIN SODIUM 5000 UNITS: 5000 INJECTION, SOLUTION INTRAVENOUS; SUBCUTANEOUS at 21:30

## 2017-07-03 RX ADMIN — DIPHENHYDRAMINE HYDROCHLORIDE 25 MG: 50 INJECTION, SOLUTION INTRAMUSCULAR; INTRAVENOUS at 22:40

## 2017-07-03 RX ADMIN — AMPICILLIN SODIUM AND SULBACTAM SODIUM 3 G: 2; 1 INJECTION, POWDER, FOR SOLUTION INTRAMUSCULAR; INTRAVENOUS at 06:25

## 2017-07-03 RX ADMIN — VANCOMYCIN HYDROCHLORIDE 900 MG: 100 INJECTION, POWDER, LYOPHILIZED, FOR SOLUTION INTRAVENOUS at 01:29

## 2017-07-03 RX ADMIN — STANDARDIZED SENNA CONCENTRATE AND DOCUSATE SODIUM 2 TABLET: 8.6; 5 TABLET, FILM COATED ORAL at 08:26

## 2017-07-03 RX ADMIN — OXYCODONE HYDROCHLORIDE AND ACETAMINOPHEN 1 TABLET: 5; 325 TABLET ORAL at 08:26

## 2017-07-03 RX ADMIN — HEPARIN SODIUM 5000 UNITS: 5000 INJECTION, SOLUTION INTRAVENOUS; SUBCUTANEOUS at 12:00

## 2017-07-03 RX ADMIN — AMPICILLIN SODIUM AND SULBACTAM SODIUM 3 G: 2; 1 INJECTION, POWDER, FOR SOLUTION INTRAMUSCULAR; INTRAVENOUS at 17:29

## 2017-07-03 RX ADMIN — OXYCODONE HYDROCHLORIDE AND ACETAMINOPHEN 1 TABLET: 5; 325 TABLET ORAL at 14:51

## 2017-07-03 RX ADMIN — STANDARDIZED SENNA CONCENTRATE AND DOCUSATE SODIUM 2 TABLET: 8.6; 5 TABLET, FILM COATED ORAL at 21:30

## 2017-07-03 RX ADMIN — VANCOMYCIN HYDROCHLORIDE 1100 MG: 100 INJECTION, POWDER, LYOPHILIZED, FOR SOLUTION INTRAVENOUS at 22:45

## 2017-07-03 RX ADMIN — VANCOMYCIN HYDROCHLORIDE 900 MG: 100 INJECTION, POWDER, LYOPHILIZED, FOR SOLUTION INTRAVENOUS at 10:36

## 2017-07-03 RX ADMIN — OXYCODONE HYDROCHLORIDE AND ACETAMINOPHEN 1 TABLET: 5; 325 TABLET ORAL at 21:03

## 2017-07-03 RX ADMIN — AMPICILLIN SODIUM AND SULBACTAM SODIUM 3 G: 2; 1 INJECTION, POWDER, FOR SOLUTION INTRAMUSCULAR; INTRAVENOUS at 13:57

## 2017-07-03 RX ADMIN — DIPHENHYDRAMINE HYDROCHLORIDE 25 MG: 50 INJECTION, SOLUTION INTRAMUSCULAR; INTRAVENOUS at 10:25

## 2017-07-03 ASSESSMENT — ENCOUNTER SYMPTOMS
BLURRED VISION: 0
STRIDOR: 0
SPEECH CHANGE: 0
SEIZURES: 0
EYE DISCHARGE: 0
SHORTNESS OF BREATH: 0
HEADACHES: 0
PALPITATIONS: 0
LOSS OF CONSCIOUSNESS: 0
NAUSEA: 0
ABDOMINAL PAIN: 0
DIARRHEA: 0
FOCAL WEAKNESS: 0
FEVER: 0
FLANK PAIN: 0
COUGH: 0
NECK PAIN: 0
ORTHOPNEA: 0
HALLUCINATIONS: 0
EYE REDNESS: 0
VOMITING: 0
BLOOD IN STOOL: 0
MEMORY LOSS: 0
WEAKNESS: 0
SPUTUM PRODUCTION: 0
BACK PAIN: 0
EYE PAIN: 0
FALLS: 0
HEMOPTYSIS: 0
NERVOUS/ANXIOUS: 0

## 2017-07-03 ASSESSMENT — PAIN SCALES - GENERAL
PAINLEVEL_OUTOF10: 2
PAINLEVEL_OUTOF10: 0
PAINLEVEL_OUTOF10: 4
PAINLEVEL_OUTOF10: 2
PAINLEVEL_OUTOF10: ASSUMED PAIN PRESENT
PAINLEVEL_OUTOF10: 0

## 2017-07-03 ASSESSMENT — LIFESTYLE VARIABLES: SUBSTANCE_ABUSE: 0

## 2017-07-03 NOTE — PROGRESS NOTES
Received pt from ED to [facility] this shift stable with no apparent distress via gurney, stable/VSS, no AMS/A&Ox4. Denied pain on initial encounter, no PRN was given. Breathing even/unlabored, noted dim/clear lung sounds, on RA/ sats at mid-high 90's on RA/denied SOB. Abdomen soft/nontender, (+)normoactiveBSx4, (-)N/V, per pt last BM was today, able to pass flatus. PIV intact/patent, (-)infil/phleb with IVF infusing x100ml/hr. 2 RN skin check done, noted tender/warm/red to bilateral breast s/p mastectomy few days ago/[facility]/no exudates noted. ROM within functional limits, SBA with ambulation, bladder continent. Oriented with rooms set-up. POC discussed. No concerns. Will continue with POC.

## 2017-07-03 NOTE — PROGRESS NOTES
[facility]    Date of Service: 7/3/2017    Chief Complaint  46 y.o. female admitted 2017 with hx of DCIS s/p bilateral mastectomies and expanders presented with rt breast cellulitis and fluid collection aspirated by Dr lott    Interval Problem Update  Complains of pain and swelling in RUE    Consultants/Specialty  Dr Lott    Disposition  home        Review of Systems   Constitutional: Positive for malaise/fatigue. Negative for fever.   HENT: Negative for congestion, ear discharge and nosebleeds.    Eyes: Negative for blurred vision, pain, discharge and redness.   Respiratory: Negative for cough, hemoptysis, sputum production, shortness of breath and stridor.    Cardiovascular: Negative for chest pain, palpitations, orthopnea and leg swelling.   Gastrointestinal: Negative for nausea, vomiting, abdominal pain, diarrhea, blood in stool and melena.   Genitourinary: Negative for dysuria, hematuria and flank pain.   Musculoskeletal: Negative for back pain, falls and neck pain.        RUE pain   Skin: Negative.    Neurological: Negative for speech change, focal weakness, seizures, loss of consciousness, weakness and headaches.   Psychiatric/Behavioral: Negative for hallucinations, memory loss and substance abuse. The patient is not nervous/anxious.    All other systems reviewed and are negative.     Physical Exam  Laboratory/Imaging   Hemodynamics  Temp (24hrs), Av.8 °C (98.3 °F), Min:36.6 °C (97.9 °F), Max:37.3 °C (99.1 °F)   Temperature: 36.6 °C (97.9 °F)  Pulse  Av.5  Min: 68  Max: 107 Heart Rate (Monitored): 84  Blood Pressure: 116/61 mmHg, NIBP: 123/61 mmHg      Respiratory      Respiration: 18, Pulse Oximetry: 96 %        RUL Breath Sounds: Clear;Diminished, RML Breath Sounds: Clear;Diminished, RLL Breath Sounds: Clear;Diminished, ERIC Breath Sounds: Clear;Diminished, LLL Breath Sounds: Clear;Diminished    Fluids    Intake/Output Summary (Last 24 hours) at 17 0839  Last  data filed at 07/03/17 0800   Gross per 24 hour   Intake   2600 ml   Output      0 ml   Net   2600 ml       Nutrition  Orders Placed This Encounter   Procedures   • Diet Order     Standing Status: Standing      Number of Occurrences: 1      Standing Expiration Date:      Order Specific Question:  Diet:     Answer:  Regular [1]     Physical Exam   Constitutional: She is oriented to person, place, and time. She appears well-developed and well-nourished.   HENT:   Head: Normocephalic and atraumatic.   Right Ear: External ear normal.   Left Ear: External ear normal.   Mouth/Throat: No oropharyngeal exudate.   Eyes: Conjunctivae are normal. Right eye exhibits no discharge. Left eye exhibits no discharge. No scleral icterus.   Neck: Neck supple. No JVD present. No tracheal deviation present.   Cardiovascular: Normal rate and regular rhythm.  Exam reveals no gallop and no friction rub.    No murmur heard.  Pulmonary/Chest: Effort normal and breath sounds normal. No stridor. No respiratory distress. She has no wheezes. She has no rales. She exhibits no tenderness.   Abdominal: Soft. Bowel sounds are normal. She exhibits no distension and no mass. There is no tenderness. There is no rebound and no guarding.   Musculoskeletal: She exhibits edema and tenderness.   Neurological: She is alert and oriented to person, place, and time. No cranial nerve deficit. She exhibits normal muscle tone.   Skin: Skin is warm and dry. She is not diaphoretic. There is erythema (rt breast ). No cyanosis. Nails show no clubbing.   Psychiatric: She has a normal mood and affect. Her behavior is normal. Thought content normal.   Nursing note and vitals reviewed.      Recent Labs      07/02/17   1530   WBC  11.0*   RBC  4.28   HEMOGLOBIN  12.8   HEMATOCRIT  37.9   MCV  88.6   MCH  29.9   MCHC  33.8   RDW  42.6   PLATELETCT  347   MPV  8.6*     Recent Labs      07/02/17   1530   SODIUM  134*   POTASSIUM  3.9   CHLORIDE  102   CO2  24   GLUCOSE  100*    BUN  10   CREATININE  0.70   CALCIUM  9.6     Recent Labs      07/02/17   1530   APTT  31.9   INR  1.00                  Assessment/Plan     * Cellulitis  Assessment & Plan  Continue IV vanc and unasyn  F/u on cx and monitor  S/p aspiration of fluid collection by Dr Birmingham    Breast abscess  Assessment & Plan  Continue abx     Pain of right upper extremity  Assessment & Plan  Given edema new pain check US        Plan of care reviewed with patient and discussed with nursing staff   Labs reviewed and Medications reviewed  Colón catheter: No Colón      DVT Prophylaxis: Heparin      Antibiotics: Treating active infection/contamination beyond 24 hours perioperative coverage

## 2017-07-03 NOTE — H&P
HOSPITAL MEDICINE HISTORY/ PHYSICAL    Date of Service:    7/2/2017   10:56 PM       Patient ID:   Name: Mine Yap. YOB: 1970. Age: 46 y.o. female. MRN: [id number]    Admitting Attending:  Shayne Dasilva     PCP : Jason Villafuerte    Outpatient surgeon Dr. Aponte as well as Dr. Birmingham        Chief Complaint:       Post operative surgical erythema, redness and pain    History of Present Illness:    Marely is a 46 y.o. female w/h/o recent diagnosis of breast cancer when the patient is status post bilateral mastectomies, and it had now reconstruction surgery with plastic surgery implanting a expanders in bilateral breasts and she had experienced now redness and pain who presents with complaints of redness and pain after drains were removed on Thursday. The patient was on antibiotics also through Thursday in form of Keflex. The patient after drains were removed developed a fever and chills and she has had developed no increasing redness over the right breast, the patient was evaluated in emergency room ultrasound and there was a small fluid collection found, the patient was referred back to plastic surgery Dr. Birmingham for intervention and evaluation. The patient is otherwise without acute complaints. She is here with her sister    Review of Systems:    Reviewed and is positive for fevers chills and breast redness.  Please see HPI, all other systems were reviewed and are negative (AMA/CMS criteria)              Past Medical/ Family / Social history (PFSH):   Past Medical History   Diagnosis Date   • Cancer (CMS-HCC)      bilateral breast   -history of alcohol use  Past Surgical History   Procedure Laterality Date   • Other       bilateral mastectomy   -status post bilateral reconstruction surgery with Dr. Birmingham  -History of traumatic splenectomy in 1992  -History of bilateral bunionectomies and toe surgeries  -History of right ACL repair in 2015    Current Outpatient Medications:  No  current facility-administered medications on file prior to encounter.     No current outpatient prescriptions on file prior to encounter.     Medication Allergy/Sensitivities:  No Known Allergies  Family History:  History reviewed. No pertinent family history. she reports multiple cancers in the family including breast cancer  Social History:  Social History   Substance Use Topics   • Smoking status: Never Smoker    • Smokeless tobacco: None   • Alcohol Use: No     #################################################################  Physical Exam:   Vitals/ General Appearance:   Weight/BMI: Body mass index is 32.68 kg/(m^2).  Blood pressure 112/68, pulse 88, temperature 36.7 °C (98.1 °F), temperature source Temporal, resp. rate 20, height 1.524 m (5'), weight 75.9 kg (167 lb 5.3 oz), last menstrual period 06/06/2017, SpO2 99 %, not currently breastfeeding.   Filed Vitals:    07/02/17 1906 07/02/17 2000 07/02/17 2030 07/02/17 2130   BP:    112/68   Pulse: 92 85 92 88   Temp:    36.7 °C (98.1 °F)   TempSrc:       Resp: 18 20 20 20   Height:       Weight:       SpO2: 99% 95% 98% 99%    Oxygen Therapy:  Pulse Oximetry: 99 %, O2 (LPM): 0, O2 Delivery: None (Room Air)    Constitutional:  well developed, well nourished, non-toxic, no acute distress  HENMT: Normocephalic, atraumatic, b/l ears normal, nose normal, MMM  Eyes:   PERRLA, EOMI, conjunctiva normal, no discharge  Neck: no tracheal deviation, supple  Cardiovascular: normal heart rate, normal rhythm, no murmurs, no rubs or gallops; no cyanosis, clubbing or edema  Chest: Bilateral status post mastectomy, with reconstruction surgery, right breast with redness and warmth and induration  Lungs: Respiratory effort is normal, normal breath sounds, breath sounds clear to auscultation b/l, no rales, rhonchi or wheezing  Abdomen: soft, non-tender, no guarding or rebound  Skin: warm, dry, no erythema, no rash  Musculoskeletal : No clubbing, no joint deformity , free range of  motion  Neurologic: Alert and oriented x 4, strength 5/5, no focal deficits, CN II-XII normal  Psychiatric: Normal Affect    #################################################################  Lab Data Review:    Objective  Recent Results (from the past 24 hour(s))   Lactic acid (lactate)    Collection Time: 07/02/17  3:30 PM   Result Value Ref Range    Lactic Acid 1.4 0.5 - 2.0 mmol/L   CBC WITH DIFFERENTIAL    Collection Time: 07/02/17  3:30 PM   Result Value Ref Range    WBC 11.0 (H) 4.8 - 10.8 K/uL    RBC 4.28 4.20 - 5.40 M/uL    Hemoglobin 12.8 12.0 - 16.0 g/dL    Hematocrit 37.9 37.0 - 47.0 %    MCV 88.6 81.4 - 97.8 fL    MCH 29.9 27.0 - 33.0 pg    MCHC 33.8 33.6 - 35.0 g/dL    RDW 42.6 35.9 - 50.0 fL    Platelet Count 347 164 - 446 K/uL    MPV 8.6 (L) 9.0 - 12.9 fL    Neutrophils-Polys 68.20 44.00 - 72.00 %    Lymphocytes 21.90 (L) 22.00 - 41.00 %    Monocytes 6.40 0.00 - 13.40 %    Eosinophils 2.60 0.00 - 6.90 %    Basophils 0.50 0.00 - 1.80 %    Immature Granulocytes 0.40 0.00 - 0.90 %    Nucleated RBC 0.00 /100 WBC    Neutrophils (Absolute) 7.50 (H) 2.00 - 7.15 K/uL    Lymphs (Absolute) 2.41 1.00 - 4.80 K/uL    Monos (Absolute) 0.71 0.00 - 0.85 K/uL    Eos (Absolute) 0.29 0.00 - 0.51 K/uL    Baso (Absolute) 0.06 0.00 - 0.12 K/uL    Immature Granulocytes (abs) 0.04 0.00 - 0.11 K/uL    NRBC (Absolute) 0.00 K/uL   COMP METABOLIC PANEL    Collection Time: 07/02/17  3:30 PM   Result Value Ref Range    Sodium 134 (L) 135 - 145 mmol/L    Potassium 3.9 3.6 - 5.5 mmol/L    Chloride 102 96 - 112 mmol/L    Co2 24 20 - 33 mmol/L    Anion Gap 8.0 0.0 - 11.9    Glucose 100 (H) 65 - 99 mg/dL    Bun 10 8 - 22 mg/dL    Creatinine 0.70 0.50 - 1.40 mg/dL    Calcium 9.6 8.5 - 10.5 mg/dL    AST(SGOT) 16 12 - 45 U/L    ALT(SGPT) 20 2 - 50 U/L    Alkaline Phosphatase 60 30 - 99 U/L    Total Bilirubin 0.4 0.1 - 1.5 mg/dL    Albumin 3.9 3.2 - 4.9 g/dL    Total Protein 7.3 6.0 - 8.2 g/dL    Globulin 3.4 1.9 - 3.5 g/dL    A-G Ratio  1.1 g/dL   URINALYSIS    Collection Time: 07/02/17  3:30 PM   Result Value Ref Range    Color Yellow     Character Clear     Specific Gravity 1.015 <1.035    Ph 6.0 5.0-8.0    Glucose Negative Negative mg/dL    Ketones Negative Negative mg/dL    Protein Negative Negative mg/dL    Bilirubin Negative Negative    Nitrite Negative Negative    Leukocyte Esterase Negative Negative    Occult Blood Negative Negative    Micro Urine Req see below    PROTHROMBIN TIME    Collection Time: 07/02/17  3:30 PM   Result Value Ref Range    PT 13.5 12.0 - 14.6 sec    INR 1.00 0.87 - 1.13   APTT    Collection Time: 07/02/17  3:30 PM   Result Value Ref Range    APTT 31.9 24.7 - 36.0 sec   ESTIMATED GFR    Collection Time: 07/02/17  3:30 PM   Result Value Ref Range    GFR If African American >60 >60 mL/min/1.73 m 2    GFR If Non African American >60 >60 mL/min/1.73 m 2   WESTERGREN SED RATE    Collection Time: 07/02/17  3:30 PM   Result Value Ref Range    Sed Rate Westergren 64 (H) 0 - 20 mm/hour   CRP HIGH SENSITIVE (CARDIAC)    Collection Time: 07/02/17  3:30 PM   Result Value Ref Range    C Reactive Protein High Sensitive 157.6 (H) 0.0 - 7.5 mg/L   Lactic acid (lactate)    Collection Time: 07/02/17  5:35 PM   Result Value Ref Range    Lactic Acid 1.2 0.5 - 2.0 mmol/L       (click the triangle to expand results)  My interpretation of lab results:     Imaging/Procedures Review:    DX-CHEST-PORTABLE (1 VIEW)   Final Result      No evidence of acute cardiopulmonary process.      US-CHEST   Final Result      Tiny amount of fluid seen within the subcutaneous fat of the right chest wall. There is no evidence of a large organized fluid collection to suggest presence of abscess.          Assessment and Plan:      1. Postoperative breast infection, cellulitis, question of abscess. The patient is pancultured and emergency room as well as started on broad-spectrum antibiotics in form of Unasyn and vancomycin. The patient is referred to plastic  surgery for aspiration of the fluid collection in the right breast and further determination. The patient will be admitted to an inpatient status with IV antibiotics and further determination  2. Possible reaction to vancomycin, patient will be medicated and atelectatic change to linezolid  3. History of bilateral breast cancer, status post mastectomy  4. 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 Mild leukocytosis  6. Prophylaxis: lovenox  7. Code: Full code   Dispo: 4 will be admitted to inpatient for management that is expected to take greater than 2 midnights, Time spent on Admission 55, the case was in detail discussed with the patient's plastic surgeon and the ER physician

## 2017-07-03 NOTE — PROGRESS NOTES
Pharmacy Kinetics 46 y.o. female on vancomycin day # 2 7/3/2017    Currently on Vancomycin 900 mg iv q8hr (1992, 5054, 4600)    Indication for Treatment: SSTI    Pertinent history per medical record: Admitted on 2017 for erythema and edema of the chest wall/breast that progressed over the course of the two days preceding admission. Pt noted associated systemic symptoms of infection. Pt underwent double mastectomy on  and had expanders placed. Drains were pulled on  and according to the pt, it was after pulling of the drains that symptoms began. Pt had been on prophylactic Keflex therapy PTA    Other antibiotics: Unasyn 3g iv q6h     Allergies: Review of patient's allergies indicates no known allergies.     List concerns for renal function: BMI ~33 kg/m2     Pertinent cultures to date:   17 - blood (peripheral) x 2: in process  17 - wound (R breast/chest): in process  17 - urine cx: in process    Recent Labs      17   1530   WBC  11.0*   NEUTSPOLYS  68.20     Recent Labs      17   1530   BUN  10   CREATININE  0.70   ALBUMIN  3.9     Recent Labs      17   0048   VANCOTROUGH  32.0*     Intake/Output Summary (Last 24 hours) at 17 1044  Last data filed at 17 1035   Gross per 24 hour   Intake   2700 ml   Output      0 ml   Net   2700 ml      Blood pressure 119/60, pulse 70, temperature 36.6 °C (97.9 °F), temperature source Temporal, resp. rate 16, height 1.524 m (5'), weight 75.9 kg (167 lb 5.3 oz), last menstrual period 2017, SpO2 100 %, not currently breastfeeding. Temp (24hrs), Av.8 °C (98.2 °F), Min:36.6 °C (97.9 °F), Max:37.3 °C (99.1 °F)    A/P   1. Vancomycin dose change: Start vancomycin 1100 mg IV Q12h (~15 mg/kg; 1030, 2230)  2. Next vancomycin level: 7/5 @ 1000  3. Goal trough: 12-16 mcg/mL  4. A/P: Patient afebrile overnight, vital signs stable. S/p aspiration of fluid collection by Dr Birmingham. Preliminary fluid cx in process. Erythema to R  breast improved per MD note. Vanco level prior to the 3rd total dose resulted supratherapeutic at 20.3 mcg/mL; fourth dose given. Patient unlikely to tolerate Q8h dosing. Will reduce to vancomycin ~15 mg/kg IV Q12h as outlined above and check a steady state level in 2 days. Follow cultures. Pharmacy to monitor and adjust as needed.     Ceci Piña, PharmD

## 2017-07-03 NOTE — CARE PLAN
Problem: Communication  Goal: The ability to communicate needs accurately and effectively will improve  Discussed POC within this shift.     Problem: Infection  Goal: Will remain free from infection  IV abx infused.

## 2017-07-03 NOTE — PROGRESS NOTES
Assumed care of pt at 0700. Pt sitting in bed, family at bedside, oriented x4, Pt rates pain 0 out of 10. Pt - N/V. + BS, + flatus, - BM. Redness noted to R breast, pt reports decrease in redness since yesterday. Pt up with standby assist. Labs noted, assessment complete. Pt updated on POC. Pt educated to use call light when in need of assisstance. Bed locked and in lowest position. All needs met at this time, call light within reach, will continue to monitor.

## 2017-07-03 NOTE — PROGRESS NOTES
Erythema on the right improved.    Afebrile  WBC 11  Sed rate 64 (elevated)    Continue abx as she has significant improvement.  Follow cultures (fluid sent yesterday)

## 2017-07-03 NOTE — PROGRESS NOTES
Pharmacy Kinetics 46 y.o. female on vancomycin day # 1  2017    Currently on Vancomycin    17 @ 1729: 1900 mg iv x1 LD    Indication for Treatment: SSTI    Pertinent history per medical record:    Admitted on 2017 for erythema and edema of the chest wall/breast that progressed over the course of the two days preceding admission. Pt noted associated systemic symptoms of infection.   Pt underwent double mastectomy on  and had expanders placed. Drains were pulled on  and according to the pt, it was after pulling of the drains that symptoms began.   Pt had been on prophylactic Keflex therapy PTA    Other antibiotics:    Unasyn 3g iv q6h     Allergies: Review of patient's allergies indicates no known allergies.     List concerns for renal function:    BMI ~ 33     Pertinent cultures to date:    17 cultures in process     Recent Labs      17   1530   WBC  11.0*   NEUTSPOLYS  68.20     Recent Labs      17   1530   BUN  10   CREATININE  0.70   ALBUMIN  3.9      Blood pressure 138/70, pulse 85, temperature 37.3 °C (99.1 °F), temperature source Temporal, resp. rate 18, height 1.524 m (5'), weight 75.9 kg (167 lb 5.3 oz), last menstrual period 2017, SpO2 96 %. Temp (24hrs), Av.3 °C (99.1 °F), Min:37.3 °C (99.1 °F), Max:37.3 °C (99.1 °F)      A/P   1. Vancomycin new start: LD as above, followed by: 900mg iv q8h (0130, 0930, 1730)  2. Next vancomycin level: 7/3/17 @ 0900  3. Goal trough: 12-16 mcg/mL  4. Comments: pt with relatively limited concern for the risk of accumulation/renal insult  a. Dose initiated per protocol  b. Trough level ordered early in order to detect accumulation early in the event it occurs    Evelin Moseley, KojoD, BCPS

## 2017-07-03 NOTE — CARE PLAN
Problem: Safety  Goal: Will remain free from falls  Outcome: PROGRESSING AS EXPECTED  Fall risk assessed, call light within reach, bed in lowest position, treaded socks on, pt educated to call for assistance     Problem: Knowledge Deficit  Goal: Knowledge of the prescribed therapeutic regimen will improve  Outcome: PROGRESSING AS EXPECTED  POC discussed, questions answered

## 2017-07-04 LAB
ANION GAP SERPL CALC-SCNC: 7 MMOL/L (ref 0–11.9)
BACTERIA UR CULT: NORMAL
BASOPHILS # BLD AUTO: 0.7 % (ref 0–1.8)
BASOPHILS # BLD: 0.05 K/UL (ref 0–0.12)
BUN SERPL-MCNC: 12 MG/DL (ref 8–22)
CALCIUM SERPL-MCNC: 8.7 MG/DL (ref 8.5–10.5)
CHLORIDE SERPL-SCNC: 107 MMOL/L (ref 96–112)
CO2 SERPL-SCNC: 23 MMOL/L (ref 20–33)
CREAT SERPL-MCNC: 0.62 MG/DL (ref 0.5–1.4)
EOSINOPHIL # BLD AUTO: 0.55 K/UL (ref 0–0.51)
EOSINOPHIL NFR BLD: 7.4 % (ref 0–6.9)
ERYTHROCYTE [DISTWIDTH] IN BLOOD BY AUTOMATED COUNT: 45.1 FL (ref 35.9–50)
GFR SERPL CREATININE-BSD FRML MDRD: >60 ML/MIN/1.73 M 2
GLUCOSE SERPL-MCNC: 109 MG/DL (ref 65–99)
HCT VFR BLD AUTO: 34 % (ref 37–47)
HGB BLD-MCNC: 11.1 G/DL (ref 12–16)
IMM GRANULOCYTES # BLD AUTO: 0.02 K/UL (ref 0–0.11)
IMM GRANULOCYTES NFR BLD AUTO: 0.3 % (ref 0–0.9)
LYMPHOCYTES # BLD AUTO: 3.79 K/UL (ref 1–4.8)
LYMPHOCYTES NFR BLD: 50.7 % (ref 22–41)
MCH RBC QN AUTO: 29.9 PG (ref 27–33)
MCHC RBC AUTO-ENTMCNC: 32.6 G/DL (ref 33.6–35)
MCV RBC AUTO: 91.6 FL (ref 81.4–97.8)
MONOCYTES # BLD AUTO: 0.48 K/UL (ref 0–0.85)
MONOCYTES NFR BLD AUTO: 6.4 % (ref 0–13.4)
NEUTROPHILS # BLD AUTO: 2.59 K/UL (ref 2–7.15)
NEUTROPHILS NFR BLD: 34.5 % (ref 44–72)
NRBC # BLD AUTO: 0 K/UL
NRBC BLD AUTO-RTO: 0 /100 WBC
PLATELET # BLD AUTO: 327 K/UL (ref 164–446)
PMV BLD AUTO: 8.8 FL (ref 9–12.9)
POTASSIUM SERPL-SCNC: 4 MMOL/L (ref 3.6–5.5)
RBC # BLD AUTO: 3.71 M/UL (ref 4.2–5.4)
SIGNIFICANT IND 70042: NORMAL
SITE SITE: NORMAL
SODIUM SERPL-SCNC: 137 MMOL/L (ref 135–145)
SOURCE SOURCE: NORMAL
WBC # BLD AUTO: 7.5 K/UL (ref 4.8–10.8)

## 2017-07-04 PROCEDURE — 85025 COMPLETE CBC W/AUTO DIFF WBC: CPT

## 2017-07-04 PROCEDURE — 700102 HCHG RX REV CODE 250 W/ 637 OVERRIDE(OP): Performed by: HOSPITALIST

## 2017-07-04 PROCEDURE — 99232 SBSQ HOSP IP/OBS MODERATE 35: CPT | Performed by: INTERNAL MEDICINE

## 2017-07-04 PROCEDURE — 700105 HCHG RX REV CODE 258: Performed by: HOSPITALIST

## 2017-07-04 PROCEDURE — 700105 HCHG RX REV CODE 258

## 2017-07-04 PROCEDURE — 36415 COLL VENOUS BLD VENIPUNCTURE: CPT

## 2017-07-04 PROCEDURE — 700111 HCHG RX REV CODE 636 W/ 250 OVERRIDE (IP): Performed by: HOSPITALIST

## 2017-07-04 PROCEDURE — A9270 NON-COVERED ITEM OR SERVICE: HCPCS | Performed by: HOSPITALIST

## 2017-07-04 PROCEDURE — 700111 HCHG RX REV CODE 636 W/ 250 OVERRIDE (IP)

## 2017-07-04 PROCEDURE — 80048 BASIC METABOLIC PNL TOTAL CA: CPT

## 2017-07-04 PROCEDURE — 770001 HCHG ROOM/CARE - MED/SURG/GYN PRIV*

## 2017-07-04 RX ORDER — DIPHENHYDRAMINE HYDROCHLORIDE 50 MG/ML
25 INJECTION INTRAMUSCULAR; INTRAVENOUS EVERY 12 HOURS
Status: DISCONTINUED | OUTPATIENT
Start: 2017-07-04 | End: 2017-07-05 | Stop reason: HOSPADM

## 2017-07-04 RX ADMIN — DIPHENHYDRAMINE HYDROCHLORIDE 25 MG: 50 INJECTION, SOLUTION INTRAMUSCULAR; INTRAVENOUS at 05:40

## 2017-07-04 RX ADMIN — VANCOMYCIN HYDROCHLORIDE 1100 MG: 100 INJECTION, POWDER, LYOPHILIZED, FOR SOLUTION INTRAVENOUS at 22:39

## 2017-07-04 RX ADMIN — HEPARIN SODIUM 5000 UNITS: 5000 INJECTION, SOLUTION INTRAVENOUS; SUBCUTANEOUS at 05:40

## 2017-07-04 RX ADMIN — HEPARIN SODIUM 5000 UNITS: 5000 INJECTION, SOLUTION INTRAVENOUS; SUBCUTANEOUS at 20:15

## 2017-07-04 RX ADMIN — AMPICILLIN SODIUM AND SULBACTAM SODIUM 3 G: 2; 1 INJECTION, POWDER, FOR SOLUTION INTRAMUSCULAR; INTRAVENOUS at 18:00

## 2017-07-04 RX ADMIN — DIPHENHYDRAMINE HYDROCHLORIDE 25 MG: 50 INJECTION, SOLUTION INTRAMUSCULAR; INTRAVENOUS at 22:39

## 2017-07-04 RX ADMIN — AMPICILLIN SODIUM AND SULBACTAM SODIUM 3 G: 2; 1 INJECTION, POWDER, FOR SOLUTION INTRAMUSCULAR; INTRAVENOUS at 05:40

## 2017-07-04 RX ADMIN — AMPICILLIN SODIUM AND SULBACTAM SODIUM 3 G: 2; 1 INJECTION, POWDER, FOR SOLUTION INTRAMUSCULAR; INTRAVENOUS at 14:01

## 2017-07-04 RX ADMIN — STANDARDIZED SENNA CONCENTRATE AND DOCUSATE SODIUM 2 TABLET: 8.6; 5 TABLET, FILM COATED ORAL at 20:15

## 2017-07-04 RX ADMIN — VANCOMYCIN HYDROCHLORIDE 1100 MG: 100 INJECTION, POWDER, LYOPHILIZED, FOR SOLUTION INTRAVENOUS at 10:55

## 2017-07-04 RX ADMIN — OXYCODONE HYDROCHLORIDE AND ACETAMINOPHEN 1 TABLET: 5; 325 TABLET ORAL at 03:35

## 2017-07-04 RX ADMIN — STANDARDIZED SENNA CONCENTRATE AND DOCUSATE SODIUM 2 TABLET: 8.6; 5 TABLET, FILM COATED ORAL at 10:55

## 2017-07-04 RX ADMIN — AMPICILLIN SODIUM AND SULBACTAM SODIUM 3 G: 2; 1 INJECTION, POWDER, FOR SOLUTION INTRAMUSCULAR; INTRAVENOUS at 02:01

## 2017-07-04 RX ADMIN — OXYCODONE HYDROCHLORIDE AND ACETAMINOPHEN 1 TABLET: 5; 325 TABLET ORAL at 16:50

## 2017-07-04 RX ADMIN — HEPARIN SODIUM 5000 UNITS: 5000 INJECTION, SOLUTION INTRAVENOUS; SUBCUTANEOUS at 13:16

## 2017-07-04 ASSESSMENT — PAIN SCALES - GENERAL
PAINLEVEL_OUTOF10: 3
PAINLEVEL_OUTOF10: 2
PAINLEVEL_OUTOF10: 0
PAINLEVEL_OUTOF10: ASSUMED PAIN PRESENT
PAINLEVEL_OUTOF10: 4
PAINLEVEL_OUTOF10: 2
PAINLEVEL_OUTOF10: 0
PAINLEVEL_OUTOF10: ASSUMED PAIN PRESENT

## 2017-07-04 ASSESSMENT — ENCOUNTER SYMPTOMS
NAUSEA: 0
PALPITATIONS: 0
SHORTNESS OF BREATH: 0
FEVER: 0
BRUISES/BLEEDS EASILY: 0
DIAPHORESIS: 0
VOMITING: 0
ABDOMINAL PAIN: 0
COUGH: 0
DIZZINESS: 0
MYALGIAS: 0
CHILLS: 0
HEADACHES: 0

## 2017-07-04 NOTE — PROGRESS NOTES
At baseline this shift, no apparent distress noted, no AMS/A&OX4, VSS. Per pt pain is well controlled at this time, PRN was given to maintain pain level. Breathing even/unlabored, noted clear/dim lung sounds, on RA/denied SOB. Abdomen soft/nontender, (+)hyperBSx4, tolerating current diet well, no N/V, no BM today but able to pass flatus, bladder continent. SLPIV when IV abx is not infusing. Redness to right breast still apparent but subsiding. 1 person assist on transfer, independent on ambulation. Care provided. Needs attended. No concerns. Will continue with POC.

## 2017-07-04 NOTE — PROGRESS NOTES
[facility]    Date of Service: 2017    Chief Complaint  46 y.o. female admitted 2017 with breast cellulitis and abscess.    Interval Problem Update  Surgery drained her abscess .  She has no fevers and infection is improving clinically.    Consultants/Specialty  Plastic surgery Dr. Nugent.    Disposition  home        Review of Systems   Constitutional: Negative for fever, chills and diaphoresis.   Respiratory: Negative for cough and shortness of breath.    Cardiovascular: Negative for chest pain, palpitations and leg swelling.        Arm swelling   Gastrointestinal: Negative for nausea, vomiting and abdominal pain.   Genitourinary: Negative for urgency and frequency.   Musculoskeletal: Negative for myalgias.   Skin: Negative for rash.   Neurological: Negative for dizziness and headaches.   Endo/Heme/Allergies: Does not bruise/bleed easily.      Physical Exam  Laboratory/Imaging   Hemodynamics  Temp (24hrs), Av.5 °C (97.7 °F), Min:36.4 °C (97.5 °F), Max:36.7 °C (98 °F)   Temperature: 36.4 °C (97.5 °F)  Pulse  Av.1  Min: 68  Max: 107    Blood Pressure: 125/61 mmHg      Respiratory      Respiration: 18, Pulse Oximetry: 97 %        RUL Breath Sounds: Clear;Diminished, RML Breath Sounds: Clear;Diminished, RLL Breath Sounds: Clear;Diminished, ERIC Breath Sounds: Clear;Diminished, LLL Breath Sounds: Clear;Diminished    Fluids    Intake/Output Summary (Last 24 hours) at 17 0800  Last data filed at 17 0541   Gross per 24 hour   Intake   2190 ml   Output      0 ml   Net   2190 ml       Nutrition  Orders Placed This Encounter   Procedures   • Diet Order     Standing Status: Standing      Number of Occurrences: 1      Standing Expiration Date:      Order Specific Question:  Diet:     Answer:  Regular [1]     Physical Exam   Constitutional: She is oriented to person, place, and time. No distress.   HENT:   Mouth/Throat: Oropharynx is clear and moist.   Eyes: Conjunctivae are  normal.   Neck: Neck supple.   Cardiovascular: Normal rate and regular rhythm.    Pulmonary/Chest: No respiratory distress. She has no wheezes. She has no rales.   Abdominal: Soft. Bowel sounds are normal.   Musculoskeletal: She exhibits no edema.   Neurological: She is alert and oriented to person, place, and time.   Skin: Skin is warm. She is not diaphoretic.   Psychiatric: Her behavior is normal.   Nursing note and vitals reviewed.      Recent Labs      07/02/17   1530  07/04/17   0139   WBC  11.0*  7.5   RBC  4.28  3.71*   HEMOGLOBIN  12.8  11.1*   HEMATOCRIT  37.9  34.0*   MCV  88.6  91.6   MCH  29.9  29.9   MCHC  33.8  32.6*   RDW  42.6  45.1   PLATELETCT  347  327   MPV  8.6*  8.8*     Recent Labs      07/02/17   1530  07/04/17   0139   SODIUM  134*  137   POTASSIUM  3.9  4.0   CHLORIDE  102  107   CO2  24  23   GLUCOSE  100*  109*   BUN  10  12   CREATININE  0.70  0.62   CALCIUM  9.6  8.7     Recent Labs      07/02/17   1530   APTT  31.9   INR  1.00                  Assessment/Plan     * Cellulitis  Assessment & Plan  Continue IV vanc and unasyn  Fluid cultures pending after drainage by surgery Dr. Nugent    Breast abscess  Assessment & Plan  Improving on antibiotics, white cell count normal today    Pain of right upper extremity  Assessment & Plan  Given edema new pain check US    Labs reviewed, Radiology images reviewed and Medications reviewed  Colón catheter: No Colón      DVT Prophylaxis: Heparin    Ulcer prophylaxis: Not indicated  Antibiotics: Treating active infection/contamination beyond 24 hours perioperative coverage  Assessed for rehab: Patient returned to prior level of function, rehabilitation not indicated at this time

## 2017-07-04 NOTE — PROGRESS NOTES
Surgical Progress Note    Author: Savana Centeno Date & Time created: 2017   9:53 AM     Interval Events:  In good spirits today, feeling significantly better. Minimal pain in right arm and chest wall. Swelling better in right arm.    Review of Systems   Constitutional: Negative for fever and chills.   Gastrointestinal: Negative for nausea and vomiting.     Hemodynamics:  Temp (24hrs), Av.4 °C (97.5 °F), Min:36.1 °C (97 °F), Max:36.7 °C (98 °F)  Temperature: 36.1 °C (97 °F)  Pulse  Av.1  Min: 65  Max: 107   Blood Pressure: 140/79 mmHg     Respiratory:    Respiration: 18, Pulse Oximetry: 95 %        RUL Breath Sounds: Clear;Diminished, RML Breath Sounds: Clear;Diminished, RLL Breath Sounds: Clear;Diminished, ERIC Breath Sounds: Clear;Diminished, LLL Breath Sounds: Clear;Diminished  Neuro:  GCS       Fluids:    Intake/Output Summary (Last 24 hours) at 17 0953  Last data filed at 17 0800   Gross per 24 hour   Intake   2190 ml   Output      0 ml   Net   2190 ml        Current Diet Order   Procedures   • Diet Order     Physical Exam   Constitutional: She is oriented to person, place, and time. She appears well-developed and well-nourished. No distress.   HENT:   Head: Normocephalic and atraumatic.   Eyes: Conjunctivae are normal.   Neck: Normal range of motion.   Cardiovascular: Normal rate.    Pulmonary/Chest: Effort normal.   Neurological: She is alert and oriented to person, place, and time.   Skin: She is not diaphoretic.   Still a small amount of erythema around the incision on the right, much improved from photographs of previous days. Bilateral incisions healing well.    Psychiatric: She has a normal mood and affect. Her behavior is normal.     Labs:  Recent Results (from the past 24 hour(s))   VANCOMYCIN TIMED    Collection Time: 17 10:25 AM   Result Value Ref Range    Vancomycin Unknown Level 20.3 ug/mL   BASIC METABOLIC PANEL    Collection Time: 17  1:39 AM   Result  Value Ref Range    Sodium 137 135 - 145 mmol/L    Potassium 4.0 3.6 - 5.5 mmol/L    Chloride 107 96 - 112 mmol/L    Co2 23 20 - 33 mmol/L    Glucose 109 (H) 65 - 99 mg/dL    Bun 12 8 - 22 mg/dL    Creatinine 0.62 0.50 - 1.40 mg/dL    Calcium 8.7 8.5 - 10.5 mg/dL    Anion Gap 7.0 0.0 - 11.9   CBC WITH DIFFERENTIAL    Collection Time: 07/04/17  1:39 AM   Result Value Ref Range    WBC 7.5 4.8 - 10.8 K/uL    RBC 3.71 (L) 4.20 - 5.40 M/uL    Hemoglobin 11.1 (L) 12.0 - 16.0 g/dL    Hematocrit 34.0 (L) 37.0 - 47.0 %    MCV 91.6 81.4 - 97.8 fL    MCH 29.9 27.0 - 33.0 pg    MCHC 32.6 (L) 33.6 - 35.0 g/dL    RDW 45.1 35.9 - 50.0 fL    Platelet Count 327 164 - 446 K/uL    MPV 8.8 (L) 9.0 - 12.9 fL    Neutrophils-Polys 34.50 (L) 44.00 - 72.00 %    Lymphocytes 50.70 (H) 22.00 - 41.00 %    Monocytes 6.40 0.00 - 13.40 %    Eosinophils 7.40 (H) 0.00 - 6.90 %    Basophils 0.70 0.00 - 1.80 %    Immature Granulocytes 0.30 0.00 - 0.90 %    Nucleated RBC 0.00 /100 WBC    Neutrophils (Absolute) 2.59 2.00 - 7.15 K/uL    Lymphs (Absolute) 3.79 1.00 - 4.80 K/uL    Monos (Absolute) 0.48 0.00 - 0.85 K/uL    Eos (Absolute) 0.55 (H) 0.00 - 0.51 K/uL    Baso (Absolute) 0.05 0.00 - 0.12 K/uL    Immature Granulocytes (abs) 0.02 0.00 - 0.11 K/uL    NRBC (Absolute) 0.00 K/uL   ESTIMATED GFR    Collection Time: 07/04/17  1:39 AM   Result Value Ref Range    GFR If African American >60 >60 mL/min/1.73 m 2    GFR If Non African American >60 >60 mL/min/1.73 m 2     Medical Decision Making, by Problem:  Active Hospital Problems    Diagnosis   • Pain of right upper extremity [M79.601]   • Cellulitis [L03.90]   • Breast abscess [N61.1]     Plan:  Gram stain with WBC and no organisms. Would continue broad spectrum antibiotics as she is responding well. Leukocytosis resolved. RUE duplex without superficial or deep venous thrombosis.   Discussed pathology results with patient and . Will need to be seen by medical oncology as an outpatient, will place  referral through my office.   Follow up with me 1-2 weeks after discharge.     Quality Measures:  Labs reviewed                      Discussed patient condition with Family and Patient

## 2017-07-04 NOTE — PROGRESS NOTES
Erythema significantly improved.    Afeb  Please recheck sed rate, crp and WBC prior to d/c  If cultures negative, then we will need to convert her to an oral abx that covers Staph and Strep.  Especially staph epi.

## 2017-07-04 NOTE — CARE PLAN
Problem: Infection  Goal: Will remain free from infection  IV abx infused.    Problem: Pain Management  Goal: Pain level will decrease to patient’s comfort goal  Pain assessed, medicated per MAR.

## 2017-07-04 NOTE — PROGRESS NOTES
Pharmacy Kinetics 46 y.o. female on vancomycin day # 3 2017    Currently on vancomycin 1100 mg IV Q12h (~15 mg/kg; 1030, 2230)    Indication for Treatment: SSTI    Pertinent history per medical record: Admitted on 2017 for erythema and edema of the chest wall/breast that progressed over the course of the two days preceding admission. Pt noted associated systemic symptoms of infection. Pt underwent double mastectomy on  and had expanders placed. Drains were pulled on  and according to the pt, it was after pulling of the drains that symptoms began. Pt had been on prophylactic Keflex therapy PTA    Other antibiotics: Unasyn 3g iv q6h     Allergies: Review of patient's allergies indicates no known allergies.     List concerns for renal function: BMI ~33 kg/m2     Pertinent cultures to date:    17 - blood (peripheral) x 2: NGTD  17 - wound (R breast/chest): Rare WBCs, no organisms seen  17 - urine cx: NGTD    Recent Labs      17   1530  17   0139   WBC  11.0*  7.5   NEUTSPOLYS  68.20  34.50*     Recent Labs      17   1530  17   0139   BUN  10  12   CREATININE  0.70  0.62   ALBUMIN  3.9   --      Recent Labs      17   0048  17   1025   VANCOTROUGH  32.0*   --    VANCORANDOM   --   20.3     Intake/Output Summary (Last 24 hours) at 17 0804  Last data filed at 17 0541   Gross per 24 hour   Intake   2190 ml   Output      0 ml   Net   2190 ml      Blood pressure 125/61, pulse 70, temperature 36.4 °C (97.5 °F), temperature source Temporal, resp. rate 18, height 1.524 m (5'), weight 75.9 kg (167 lb 5.3 oz), last menstrual period 2017, SpO2 97 %, not currently breastfeeding. Temp (24hrs), Av.5 °C (97.7 °F), Min:36.4 °C (97.5 °F), Max:36.7 °C (98 °F)    A/P   1. Vancomycin dose change: Continue vancomycin 1100 mg IV Q12h (~15 mg/kg; 1030, 2230)  2. Next vancomycin level: /5 @ 2200  3. Goal trough: 12-16 mcg/mL  4. A/P: Patient afebrile  overnight, white count normalized today. Vital signs stable. Renal indices stable. Patient is s/p aspiration of fluid collection by Dr Birmingham on 7/2. Preliminary cx all negative thus far. Per MD note, infection is improving clinically. Will continue with vancomycin ~15 mg/kg IV Q12h as outlined above and check a level tomorrow @ 2200. Recommend de-escalation to PO abx tomorrow. Pharmacy to monitor and adjust as needed.     Ceci Piña, KojoD

## 2017-07-05 ENCOUNTER — HOSPITAL ENCOUNTER (OUTPATIENT)
Dept: RADIOLOGY | Facility: MEDICAL CENTER | Age: 47
End: 2017-07-05

## 2017-07-05 VITALS
WEIGHT: 167.33 LBS | OXYGEN SATURATION: 97 % | RESPIRATION RATE: 20 BRPM | DIASTOLIC BLOOD PRESSURE: 65 MMHG | BODY MASS INDEX: 32.85 KG/M2 | TEMPERATURE: 97.6 F | HEART RATE: 69 BPM | HEIGHT: 60 IN | SYSTOLIC BLOOD PRESSURE: 127 MMHG

## 2017-07-05 PROBLEM — M79.601 PAIN OF RIGHT UPPER EXTREMITY: Status: RESOLVED | Noted: 2017-07-03 | Resolved: 2017-07-05

## 2017-07-05 LAB
ANION GAP SERPL CALC-SCNC: 9 MMOL/L (ref 0–11.9)
BASOPHILS # BLD AUTO: 0.5 % (ref 0–1.8)
BASOPHILS # BLD: 0.04 K/UL (ref 0–0.12)
BUN SERPL-MCNC: 11 MG/DL (ref 8–22)
CALCIUM SERPL-MCNC: 8.6 MG/DL (ref 8.5–10.5)
CHLORIDE SERPL-SCNC: 108 MMOL/L (ref 96–112)
CO2 SERPL-SCNC: 21 MMOL/L (ref 20–33)
CREAT SERPL-MCNC: 0.7 MG/DL (ref 0.5–1.4)
CRP SERPL HS-MCNC: 2.56 MG/DL (ref 0–0.75)
EOSINOPHIL # BLD AUTO: 0.59 K/UL (ref 0–0.51)
EOSINOPHIL NFR BLD: 7.9 % (ref 0–6.9)
ERYTHROCYTE [DISTWIDTH] IN BLOOD BY AUTOMATED COUNT: 42.7 FL (ref 35.9–50)
ERYTHROCYTE [SEDIMENTATION RATE] IN BLOOD BY WESTERGREN METHOD: 35 MM/HOUR (ref 0–20)
GFR SERPL CREATININE-BSD FRML MDRD: >60 ML/MIN/1.73 M 2
GLUCOSE SERPL-MCNC: 100 MG/DL (ref 65–99)
HCT VFR BLD AUTO: 34.8 % (ref 37–47)
HGB BLD-MCNC: 11.4 G/DL (ref 12–16)
IMM GRANULOCYTES # BLD AUTO: 0.02 K/UL (ref 0–0.11)
IMM GRANULOCYTES NFR BLD AUTO: 0.3 % (ref 0–0.9)
LYMPHOCYTES # BLD AUTO: 3.39 K/UL (ref 1–4.8)
LYMPHOCYTES NFR BLD: 45.3 % (ref 22–41)
MCH RBC QN AUTO: 29.5 PG (ref 27–33)
MCHC RBC AUTO-ENTMCNC: 32.8 G/DL (ref 33.6–35)
MCV RBC AUTO: 89.9 FL (ref 81.4–97.8)
MONOCYTES # BLD AUTO: 0.54 K/UL (ref 0–0.85)
MONOCYTES NFR BLD AUTO: 7.2 % (ref 0–13.4)
NEUTROPHILS # BLD AUTO: 2.91 K/UL (ref 2–7.15)
NEUTROPHILS NFR BLD: 38.8 % (ref 44–72)
NRBC # BLD AUTO: 0 K/UL
NRBC BLD AUTO-RTO: 0 /100 WBC
PLATELET # BLD AUTO: 364 K/UL (ref 164–446)
PMV BLD AUTO: 8.6 FL (ref 9–12.9)
POTASSIUM SERPL-SCNC: 4.2 MMOL/L (ref 3.6–5.5)
RBC # BLD AUTO: 3.87 M/UL (ref 4.2–5.4)
SODIUM SERPL-SCNC: 138 MMOL/L (ref 135–145)
WBC # BLD AUTO: 7.5 K/UL (ref 4.8–10.8)

## 2017-07-05 PROCEDURE — 86140 C-REACTIVE PROTEIN: CPT

## 2017-07-05 PROCEDURE — 700111 HCHG RX REV CODE 636 W/ 250 OVERRIDE (IP)

## 2017-07-05 PROCEDURE — A9270 NON-COVERED ITEM OR SERVICE: HCPCS | Performed by: HOSPITALIST

## 2017-07-05 PROCEDURE — 85652 RBC SED RATE AUTOMATED: CPT

## 2017-07-05 PROCEDURE — 700105 HCHG RX REV CODE 258: Performed by: HOSPITALIST

## 2017-07-05 PROCEDURE — 700105 HCHG RX REV CODE 258

## 2017-07-05 PROCEDURE — 700102 HCHG RX REV CODE 250 W/ 637 OVERRIDE(OP): Performed by: HOSPITALIST

## 2017-07-05 PROCEDURE — 99239 HOSP IP/OBS DSCHRG MGMT >30: CPT | Performed by: INTERNAL MEDICINE

## 2017-07-05 PROCEDURE — 85025 COMPLETE CBC W/AUTO DIFF WBC: CPT

## 2017-07-05 PROCEDURE — 700111 HCHG RX REV CODE 636 W/ 250 OVERRIDE (IP): Performed by: HOSPITALIST

## 2017-07-05 PROCEDURE — 36415 COLL VENOUS BLD VENIPUNCTURE: CPT

## 2017-07-05 PROCEDURE — 80048 BASIC METABOLIC PNL TOTAL CA: CPT

## 2017-07-05 RX ORDER — AMOXICILLIN AND CLAVULANATE POTASSIUM 875; 125 MG/1; MG/1
1 TABLET, FILM COATED ORAL 2 TIMES DAILY
Qty: 10 TAB | Refills: 0 | Status: SHIPPED | OUTPATIENT
Start: 2017-07-05

## 2017-07-05 RX ORDER — DOXYCYCLINE HYCLATE 100 MG
100 TABLET ORAL 2 TIMES DAILY
Qty: 10 TAB | Refills: 0 | Status: SHIPPED | OUTPATIENT
Start: 2017-07-05 | End: 2017-07-05

## 2017-07-05 RX ORDER — DOXYCYCLINE HYCLATE 100 MG
100 TABLET ORAL 2 TIMES DAILY
Qty: 10 TAB | Refills: 0 | Status: SHIPPED | OUTPATIENT
Start: 2017-07-05

## 2017-07-05 RX ORDER — AMOXICILLIN AND CLAVULANATE POTASSIUM 875; 125 MG/1; MG/1
1 TABLET, FILM COATED ORAL 2 TIMES DAILY
Qty: 10 TAB | Refills: 0 | Status: SHIPPED | OUTPATIENT
Start: 2017-07-05 | End: 2017-07-05

## 2017-07-05 RX ADMIN — ALPRAZOLAM 0.25 MG: 0.25 TABLET ORAL at 04:29

## 2017-07-05 RX ADMIN — HEPARIN SODIUM 5000 UNITS: 5000 INJECTION, SOLUTION INTRAVENOUS; SUBCUTANEOUS at 04:29

## 2017-07-05 RX ADMIN — AMPICILLIN SODIUM AND SULBACTAM SODIUM 3 G: 2; 1 INJECTION, POWDER, FOR SOLUTION INTRAMUSCULAR; INTRAVENOUS at 01:59

## 2017-07-05 RX ADMIN — AMPICILLIN SODIUM AND SULBACTAM SODIUM 3 G: 2; 1 INJECTION, POWDER, FOR SOLUTION INTRAMUSCULAR; INTRAVENOUS at 05:41

## 2017-07-05 RX ADMIN — VANCOMYCIN HYDROCHLORIDE 1100 MG: 100 INJECTION, POWDER, LYOPHILIZED, FOR SOLUTION INTRAVENOUS at 11:11

## 2017-07-05 RX ADMIN — DIPHENHYDRAMINE HYDROCHLORIDE 25 MG: 50 INJECTION, SOLUTION INTRAMUSCULAR; INTRAVENOUS at 10:10

## 2017-07-05 ASSESSMENT — PAIN SCALES - GENERAL
PAINLEVEL_OUTOF10: 0
PAINLEVEL_OUTOF10: 0
PAINLEVEL_OUTOF10: ASSUMED PAIN PRESENT

## 2017-07-05 ASSESSMENT — LIFESTYLE VARIABLES: EVER_SMOKED: NEVER

## 2017-07-05 NOTE — DISCHARGE PLANNING
OUT OF NETWORK, she does have a PPO so that’s her choice, they will not request a transfer, but if she wants to transfer to in network,  that’s fine.Will be an out of network percentage, and will be higher [phone]

## 2017-07-05 NOTE — DISCHARGE SUMMARY
CHIEF COMPLAINT ON ADMISSION  Chief Complaint   Patient presents with   • Sent by MD     Sent by Dr. Birmingham, double mastectomy on June 14, drains pulled on Thursday, breast red, swollen, hot to touch, patient febrile   RIGHT BREAST INFECTION    CODE STATUS  Full Code    HPI & HOSPITAL COURSE  This is a 46 y.o. female here with right breast swelling, redness and pain despite outpatient keflex antibiotic. She was admitted and treated with iv antibiotics. Blood cultures were negative for any growth and her redness and pain improved. She did have some right arm pain and a duplex scan showed no abscess or deep venous thrombosis. Her pain and swelling improved.     Therefore, she is discharged in good and stable condition with close outpatient follow-up.    SPECIFIC OUTPATIENT FOLLOW-UP  Plastic surgery Dr. Birmingham  Primary care provider Jason Villafuerte    DISCHARGE PROBLEM LIST  Principal Problem:    Cellulitis POA: Unknown  Active Problems:    Breast abscess POA: Unknown  Resolved Problems:    Pain of right upper extremity POA: Unknown      FOLLOW UP  No future appointments.  No follow-up provider specified.    MEDICATIONS ON DISCHARGE   Mine Yap   Home Medication Instructions MARIAMA:[id number]    Printed on:07/05/17 1155   Medication Information                      amoxicillin-clavulanate (AUGMENTIN) 875-125 MG Tab  Take 1 Tab by mouth 2 times a day.             docusate sodium (COLACE) 100 MG Cap  Take 200 mg by mouth every day.             doxycycline (VIBRAMYCIN) 100 MG Tab  Take 1 Tab by mouth 2 times a day.             Multiple Vitamins-Minerals (MULTIVITAMIN GUMMIES ADULT) Chew Tab  Take 2 Tabs by mouth every day.             oxycodone-acetaminophen (PERCOCET) 5-325 MG Tab  Take 1-2 Tabs by mouth every 6 hours as needed for Severe Pain.                 DIET  Orders Placed This Encounter   Procedures   • Diet Order     Standing Status: Standing      Number of Occurrences: 1      Standing Expiration Date:       Order Specific Question:  Diet:     Answer:  Regular [1]       ACTIVITY  As tolerated.  Weight bearing as tolerated      CONSULTATIONS  Plastic surgery Dr. Birmingham    PROCEDURES  None    LABORATORY  Lab Results   Component Value Date/Time    SODIUM 138 07/05/2017 02:22 AM    POTASSIUM 4.2 07/05/2017 02:22 AM    CHLORIDE 108 07/05/2017 02:22 AM    CO2 21 07/05/2017 02:22 AM    GLUCOSE 100* 07/05/2017 02:22 AM    BUN 11 07/05/2017 02:22 AM    CREATININE 0.70 07/05/2017 02:22 AM        Lab Results   Component Value Date/Time    WBC 7.5 07/05/2017 02:22 AM    HEMOGLOBIN 11.4* 07/05/2017 02:22 AM    HEMATOCRIT 34.8* 07/05/2017 02:22 AM    PLATELET COUNT 364 07/05/2017 02:22 AM        Total time of the discharge process exceeds 40 minutes

## 2017-07-05 NOTE — CARE PLAN
Problem: Infection  Goal: Will remain free from infection  On continuous dose of IV abx.    Problem: Venous Thromboembolism (VTW)/Deep Vein Thrombosis (DVT) Prevention:  Goal: Patient will participate in Venous Thrombosis (VTE)/Deep Vein Thrombosis (DVT)Prevention Measures  Encouraged pt to ambulate more frequently to hallway, refused SCDs.

## 2017-07-05 NOTE — DISCHARGE INSTRUCTIONS
Discharge Instructions    Discharged to home by car with relative. Discharged via walking, hospital escort: Refused.  Special equipment needed: Not Applicable    Be sure to schedule a follow-up appointment with your primary care doctor or any specialists as instructed.     Discharge Plan:   Diet Plan: Discussed  Activity Level: Discussed  Confirmed Follow up Appointment: Patient to Call and Schedule Appointment  Confirmed Symptoms Management: Discussed  Medication Reconciliation Updated: Yes  Influenza Vaccine Indication: Indicated: Not available from distributor/    I understand that a diet low in cholesterol, fat, and sodium is recommended for good health. Unless I have been given specific instructions below for another diet, I accept this instruction as my diet prescription.   Other diet: Regular as tolerated    Special Instructions: None    · Is patient discharged on Warfarin / Coumadin?   No     · Is patient Post Blood Transfusion?  No    Depression / Suicide Risk    As you are discharged from this [facility] facility, it is important to learn how to keep safe from harming yourself.    Recognize the warning signs:  · Abrupt changes in personality, positive or negative- including increase in energy   · Giving away possessions  · Change in eating patterns- significant weight changes-  positive or negative  · Change in sleeping patterns- unable to sleep or sleeping all the time   · Unwillingness or inability to communicate  · Depression  · Unusual sadness, discouragement and loneliness  · Talk of wanting to die  · Neglect of personal appearance   · Rebelliousness- reckless behavior  · Withdrawal from people/activities they love  · Confusion- inability to concentrate     If you or a loved one observes any of these behaviors or has concerns about self-harm, here's what you can do:  · Talk about it- your feelings and reasons for harming yourself  · Remove any means that you might use to hurt yourself  (examples: pills, rope, extension cords, firearm)  · Get professional help from the community (Mental Health, Substance Abuse, psychological counseling)  · Do not be alone:Call your Safe Contact- someone whom you trust who will be there for you.  · Call your local CRISIS HOTLINE 998-8509 or 101-779-6859  · Call your local Children's Mobile Crisis Response Team [place] (686) 778-4260 or www."[employer]"  · Call the toll free National Suicide Prevention Hotlines   · National Suicide Prevention Lifeline 795-508-AYZC (7736)  · National Hope Line Network 800-SUICIDE (198-0866)

## 2017-07-05 NOTE — PROGRESS NOTES
At baseline this shift, no apparent distress noted, no AMS/A&Ox4, VSS/ Denied pain on initial encounter, no PRN was given at this time. Breathing even/unlabored, noted dim/clear lung sounds, on RA/denied SOB. Abdomen soft/nontender, (+)hyperBSx4, tolerating current diet well, no N/V, last BM was today able to pass flatus. SLPIV when IV is not infusing. Redness to right breast is subsiding. 1 person assist on getting OOB but upself/ambulatory. Bladder continent. Care provided. Needs attended. No concerns. Will continue with POC.

## 2017-07-06 LAB
BACTERIA WND AEROBE CULT: NORMAL
GRAM STN SPEC: NORMAL
SIGNIFICANT IND 70042: NORMAL
SITE SITE: NORMAL
SOURCE SOURCE: NORMAL

## 2017-07-07 LAB
BACTERIA BLD CULT: NORMAL
BACTERIA BLD CULT: NORMAL
SIGNIFICANT IND 70042: NORMAL
SIGNIFICANT IND 70042: NORMAL
SITE SITE: NORMAL
SITE SITE: NORMAL
SOURCE SOURCE: NORMAL
SOURCE SOURCE: NORMAL

## 2017-07-08 LAB
BACTERIA SPEC ANAEROBE CULT: NORMAL
SIGNIFICANT IND 70042: NORMAL
SITE SITE: NORMAL
SOURCE SOURCE: NORMAL

## 2017-07-12 NOTE — CONSULTS
Chief Complaint:        Post operative surgical erythema, redness and pain    History of Present Illness:    Marely is a 46 y.o. female w/h/o recent diagnosis of breast cancer when the patient is status post bilateral mastectomies and expander reconstruction 3 weeks ago. She is partially expanded and had her breast drains removed a few days ago.  She now has redness and pain in the right breast after the drains were removed on Thursday. The patient was on antibiotics also through Thursday in form of Keflex. The patient after drains were removed developed a fever and chills and she has had developed increasing redness over the right breast, the patient was evaluated in emergency room ultrasound and there was a small fluid collection found.    Review of Systems:    Reviewed and is positive for fevers chills and breast redness.  Please see HPI, all other systems were reviewed and are negative (AMA/CMS criteria)               Past Medical/ Family / Social history (PFSH):   Past Medical History    Diagnosis  Date    •  Cancer (CMS-HCC)          bilateral breast    -history of alcohol use  Past Surgical History    Procedure  Laterality  Date    •  Other            bilateral mastectomy    -status post bilateral reconstruction surgery with Dr. Birmingham  -History of traumatic splenectomy in 1992  -History of bilateral bunionectomies and toe surgeries  -History of right ACL repair in 2015    Current Outpatient Medications:  No current facility-administered medications on file prior to encounter.        No current outpatient prescriptions on file prior to encounter.      Medication Allergy/Sensitivities:  No Known Allergies  Family History:   Family History     History reviewed. No pertinent family history.    she reports multiple cancers in the family including breast cancer  Social History:  Social History    Substance Use Topics    •  Smoking status:  Never Smoker     •  Smokeless tobacco:  None    •  Alcohol Use:  No       #################################################################  Physical Exam:   Vitals/ General Appearance:   Weight/BMI: Body mass index is 32.68 kg/(m^2).  Blood pressure 112/68, pulse 88, temperature 36.7 °C (98.1 °F), temperature source Temporal, resp. rate 20, height 1.524 m (5'), weight 75.9 kg (167 lb 5.3 oz), last menstrual period 06/06/2017, SpO2 99 %, not currently breastfeeding.    Vitals     Filed Vitals:      07/02/17 1906  07/02/17 2000  07/02/17 2030 07/02/17 2130    BP:        112/68    Pulse:  92  85  92  88    Temp:        36.7 °C (98.1 °F)    TempSrc:            Resp:  18  20  20  20    Height:            Weight:            SpO2:  99%  95%  98%  99%        Oxygen Therapy:  Pulse Oximetry: 99 %, O2 (LPM): 0, O2 Delivery: None (Room Air)    Constitutional:  well developed, well nourished, non-toxic, no acute distress  HENMT: Normocephalic, atraumatic, b/l ears normal, nose normal, MMM  Eyes:   PERRLA, EOMI, conjunctiva normal, no discharge  Neck: no tracheal deviation, supple  Cardiovascular: normal heart rate, normal rhythm, no murmurs, no rubs or gallops; no cyanosis, clubbing or edema  Chest: Bilateral status post mastectomy, with reconstruction surgery, right breast with redness and warmth and induration  Lungs: Respiratory effort is normal, normal breath sounds, breath sounds clear to auscultation b/l, no rales, rhonchi or wheezing  Abdomen: soft, non-tender, no guarding or rebound  Skin: slight erythema of the right breast, left breast looks good.  Incisions are c/d/i  Musculoskeletal : No clubbing, no joint deformity , free range of motion  Neurologic: Alert and oriented x 4, strength 5/5, no focal deficits, CN II-XII normal  Psychiatric: Normal Affect    #################################################################  Lab Data Review:    Objective   Recent Results     Recent Results (from the past 24 hour(s))    Lactic acid (lactate)      Collection Time: 07/02/17  3:30 PM     Result  Value  Ref Range      Lactic Acid  1.4  0.5 - 2.0 mmol/L    CBC WITH DIFFERENTIAL      Collection Time: 07/02/17  3:30 PM    Result  Value  Ref Range      WBC  11.0 (H)  4.8 - 10.8 K/uL      RBC  4.28  4.20 - 5.40 M/uL      Hemoglobin  12.8  12.0 - 16.0 g/dL      Hematocrit  37.9  37.0 - 47.0 %      MCV  88.6  81.4 - 97.8 fL      MCH  29.9  27.0 - 33.0 pg      MCHC  33.8  33.6 - 35.0 g/dL      RDW  42.6  35.9 - 50.0 fL      Platelet Count  347  164 - 446 K/uL      MPV  8.6 (L)  9.0 - 12.9 fL      Neutrophils-Polys  68.20  44.00 - 72.00 %      Lymphocytes  21.90 (L)  22.00 - 41.00 %      Monocytes  6.40  0.00 - 13.40 %      Eosinophils  2.60  0.00 - 6.90 %      Basophils  0.50  0.00 - 1.80 %      Immature Granulocytes  0.40  0.00 - 0.90 %      Nucleated RBC  0.00  /100 WBC      Neutrophils (Absolute)  7.50 (H)  2.00 - 7.15 K/uL      Lymphs (Absolute)  2.41  1.00 - 4.80 K/uL      Monos (Absolute)  0.71  0.00 - 0.85 K/uL      Eos (Absolute)  0.29  0.00 - 0.51 K/uL      Baso (Absolute)  0.06  0.00 - 0.12 K/uL      Immature Granulocytes (abs)  0.04  0.00 - 0.11 K/uL      NRBC (Absolute)  0.00  K/uL    COMP METABOLIC PANEL      Collection Time: 07/02/17  3:30 PM    Result  Value  Ref Range      Sodium  134 (L)  135 - 145 mmol/L      Potassium  3.9  3.6 - 5.5 mmol/L      Chloride  102  96 - 112 mmol/L      Co2  24  20 - 33 mmol/L      Anion Gap  8.0  0.0 - 11.9      Glucose  100 (H)  65 - 99 mg/dL      Bun  10  8 - 22 mg/dL      Creatinine  0.70  0.50 - 1.40 mg/dL      Calcium  9.6  8.5 - 10.5 mg/dL      AST(SGOT)  16  12 - 45 U/L      ALT(SGPT)  20  2 - 50 U/L      Alkaline Phosphatase  60  30 - 99 U/L      Total Bilirubin  0.4  0.1 - 1.5 mg/dL      Albumin  3.9  3.2 - 4.9 g/dL      Total Protein  7.3  6.0 - 8.2 g/dL      Globulin  3.4  1.9 - 3.5 g/dL      A-G Ratio  1.1  g/dL    URINALYSIS      Collection Time: 07/02/17  3:30 PM    Result  Value  Ref Range      Color  Yellow        Character  Clear         Specific Gravity  1.015  <1.035      Ph  6.0  5.0-8.0      Glucose  Negative  Negative mg/dL      Ketones  Negative  Negative mg/dL      Protein  Negative  Negative mg/dL      Bilirubin  Negative  Negative      Nitrite  Negative  Negative      Leukocyte Esterase  Negative  Negative      Occult Blood  Negative  Negative      Micro Urine Req  see below      PROTHROMBIN TIME      Collection Time: 07/02/17  3:30 PM    Result  Value  Ref Range      PT  13.5  12.0 - 14.6 sec      INR  1.00  0.87 - 1.13    APTT      Collection Time: 07/02/17  3:30 PM    Result  Value  Ref Range      APTT  31.9  24.7 - 36.0 sec    ESTIMATED GFR      Collection Time: 07/02/17  3:30 PM    Result  Value  Ref Range      GFR If African American  >60  >60 mL/min/1.73 m 2      GFR If Non African American  >60  >60 mL/min/1.73 m 2    WESTERGREN SED RATE      Collection Time: 07/02/17  3:30 PM    Result  Value  Ref Range      Sed Rate Westergren  64 (H)  0 - 20 mm/hour    CRP HIGH SENSITIVE (CARDIAC)      Collection Time: 07/02/17  3:30 PM    Result  Value  Ref Range      C Reactive Protein High Sensitive  157.6 (H)  0.0 - 7.5 mg/L    Lactic acid (lactate)      Collection Time: 07/02/17  5:35 PM    Result  Value  Ref Range      Lactic Acid  1.2  0.5 - 2.0 mmol/L           (click the triangle to expand results)  My interpretation of lab results:     Imaging/Procedures Review:     DX-CHEST-PORTABLE (1 VIEW)    Final Result        No evidence of acute cardiopulmonary process.        US-CHEST    Final Result        Tiny amount of fluid seen within the subcutaneous fat of the right chest wall. There is no evidence of a large organized fluid collection to suggest presence of abscess.            Assessment and Plan:        1. Postoperative right breast cellulitis after expander reconstruction.  The patient is pancultured and emergency room as well as started on broad-spectrum antibiotics in form of Unasyn and vancomycin.   2. Aspiration of the fluid  collection in the right breast will be performed.   3. The patient will be admitted to an inpatient status with IV antibiotics and further determination

## 2019-08-22 NOTE — IP AVS SNAPSHOT
" <p align=\"LEFT\"><IMG SRC=\"[email or web address]\" alt=\"Image\" WIDTH=\"50%\" HEIGHT=\"200\" BORDER=\"\"></p>                   Name:Mine Yap  Medical Record Number:[id number]  CSN: [id number]    YOB: 1970   Age: 46 y.o.  Sex: female  HT:1.524 m (5') WT: 75.9 kg (167 lb 5.3 oz)          Admit Date: 7/2/2017     Discharge Date:   Today's Date: 7/5/2017  Attending Doctor:  Jairo Mason M.D.                  Allergies:  Review of patient's allergies indicates no known allergies.          Follow-up Information     1. Follow up with Holly Nugent M.D. In 1 week.    Specialty:  Plastic Surgery    Contact information    [address] [facility] [id number]  [facility] [address]  [phone]           Medication List      Take these Medications        Instructions    amoxicillin-clavulanate 875-125 MG Tabs   Commonly known as:  AUGMENTIN    Take 1 Tab by mouth 2 times a day.   Dose:  1 Tab       docusate sodium 100 MG Caps   Commonly known as:  COLACE    Take 200 mg by mouth every day.   Dose:  200 mg       doxycycline 100 MG Tabs   Commonly known as:  VIBRAMYCIN    Take 1 Tab by mouth 2 times a day.   Dose:  100 mg       MULTIVITAMIN GUMMIES ADULT Chew    Take 2 Tabs by mouth every day.   Dose:  2 Tab       oxycodone-acetaminophen 5-325 MG Tabs   Commonly known as:  PERCOCET    Take 1-2 Tabs by mouth every 6 hours as needed for Severe Pain.   Dose:  1-2 Tab         " Statement Selected Statement Selected Statement Selected